# Patient Record
Sex: FEMALE | Race: WHITE | NOT HISPANIC OR LATINO | Employment: OTHER | URBAN - METROPOLITAN AREA
[De-identification: names, ages, dates, MRNs, and addresses within clinical notes are randomized per-mention and may not be internally consistent; named-entity substitution may affect disease eponyms.]

---

## 2018-02-12 ENCOUNTER — OFFICE VISIT (OUTPATIENT)
Dept: FAMILY MEDICINE CLINIC | Facility: MEDICAL CENTER | Age: 78
End: 2018-02-12

## 2018-02-12 DIAGNOSIS — Z53.21 PROC/TRTMT NOT CRD OUT D/T PT LV BEF SEEN BY HLTH CARE PROV: Primary | ICD-10-CM

## 2018-02-12 PROCEDURE — 99024 POSTOP FOLLOW-UP VISIT: CPT | Performed by: FAMILY MEDICINE

## 2018-02-13 ENCOUNTER — APPOINTMENT (OUTPATIENT)
Dept: RADIOLOGY | Facility: MEDICAL CENTER | Age: 78
End: 2018-02-13
Payer: MEDICARE

## 2018-02-13 ENCOUNTER — OFFICE VISIT (OUTPATIENT)
Dept: URGENT CARE | Facility: MEDICAL CENTER | Age: 78
End: 2018-02-13
Payer: MEDICARE

## 2018-02-13 VITALS
OXYGEN SATURATION: 96 % | WEIGHT: 211 LBS | TEMPERATURE: 98.3 F | HEIGHT: 65 IN | DIASTOLIC BLOOD PRESSURE: 80 MMHG | HEART RATE: 87 BPM | BODY MASS INDEX: 35.16 KG/M2 | RESPIRATION RATE: 18 BRPM | SYSTOLIC BLOOD PRESSURE: 160 MMHG

## 2018-02-13 DIAGNOSIS — S82.831A CLOSED FRACTURE OF DISTAL END OF RIGHT FIBULA, UNSPECIFIED FRACTURE MORPHOLOGY, INITIAL ENCOUNTER: Primary | ICD-10-CM

## 2018-02-13 PROCEDURE — 99213 OFFICE O/P EST LOW 20 MIN: CPT

## 2018-02-13 PROCEDURE — 73610 X-RAY EXAM OF ANKLE: CPT

## 2018-02-13 PROCEDURE — G0463 HOSPITAL OUTPT CLINIC VISIT: HCPCS

## 2018-02-13 RX ORDER — LOSARTAN POTASSIUM 100 MG/1
100 TABLET ORAL
COMMUNITY
Start: 2018-02-07

## 2018-02-13 RX ORDER — DIAPER,BRIEF,ADULT, DISPOSABLE
1200 EACH MISCELLANEOUS
COMMUNITY
Start: 2013-08-27

## 2018-02-13 RX ORDER — GLIMEPIRIDE 2 MG/1
TABLET ORAL
COMMUNITY
Start: 2015-06-29

## 2018-02-13 RX ORDER — INFLIXIMAB 100 MG/10ML
100 INJECTION, POWDER, LYOPHILIZED, FOR SOLUTION INTRAVENOUS
COMMUNITY
Start: 2013-08-14 | End: 2021-07-13 | Stop reason: ALTCHOICE

## 2018-02-13 RX ORDER — PILOCARPINE HYDROCHLORIDE 40 MG/ML
SOLUTION/ DROPS OPHTHALMIC
COMMUNITY
Start: 2017-06-09

## 2018-02-13 RX ORDER — FOLIC ACID 1 MG/1
1 TABLET ORAL
COMMUNITY
Start: 2013-08-14 | End: 2021-11-29 | Stop reason: SDUPTHER

## 2018-02-13 NOTE — PATIENT INSTRUCTIONS
Short leg splint applied  No weight-bearing  Follow up Orthopedics  Take Tylenol as directed for pain  Use ice as directed  Go to the ER for worsening symptoms  Ankle Fracture   WHAT YOU NEED TO KNOW:   An ankle fracture is a break in 1 or more of the bones in your ankle  DISCHARGE INSTRUCTIONS:   Call 911 for any of the following:   · You feel lightheaded, short of breath, and have chest pain  · You cough up blood  Return to the emergency department if:   · Your leg feels warm, tender, and painful  It may look swollen and red  · Blood soaks through your bandage  · You have severe pain in your ankle  · Your cast feels too tight  · Your foot or toes are cold or numb  · Your foot or toenails turn blue or gray  Contact your healthcare provider if:   · Your splint feels too tight  · Your swelling has increased or returned  · You have a fever  · Your pain does not go away, even after treatment  · You have questions or concerns about your condition or care  Medicines: You may need any of the following:  · Acetaminophen  decreases pain and fever  It is available without a doctor's order  Ask how much to take and how often to take it  Follow directions  Acetaminophen can cause liver damage if not taken correctly  · NSAIDs , such as ibuprofen, help decrease swelling, pain, and fever  This medicine is available with or without a doctor's order  NSAIDs can cause stomach bleeding or kidney problems in certain people  If you take blood thinner medicine, always ask your healthcare provider if NSAIDs are safe for you  Always read the medicine label and follow directions  · Prescription pain medicine  may be given  Ask your healthcare provider how to take this medicine safely  · Take your medicine as directed  Contact your healthcare provider if you think your medicine is not helping or if you have side effects  Tell him or her if you are allergic to any medicine   Keep a list of the medicines, vitamins, and herbs you take  Include the amounts, and when and why you take them  Bring the list or the pill bottles to follow-up visits  Carry your medicine list with you in case of an emergency  Follow up with your healthcare provider in 1 to 2 days: Your fracture may need to be reduced (bones pushed back into place) or you may need surgery  Write down your questions so you remember to ask them during your visits  Support devices: You will be given a brace, cast, or splint to limit your movement and protect your ankle  You may need to use crutches to protect your ankle and decrease your pain as you move around  Do not remove your device and do not put weight on your injured ankle  Splint and cast care:  Cover the splint or cast before you bathe so it does not get wet  Tape 2 plastic trash bags to your skin above the cast  Try to keep your ankle out of the water as much as possible  Rest:  Rest your ankle so that it can heal  Return to normal activities as directed  Ice:  Apply ice on your ankle for 15 to 20 minutes every hour or as directed  Use an ice pack, or put crushed ice in a plastic bag  Cover it with a towel  Ice helps prevent tissue damage and decreases swelling and pain  Elevate:  Elevate your ankle above the level of your heart as often as you can  This will help decrease swelling and pain  Prop your ankle on pillows or blankets to keep it elevated comfortably  © 2017 [employer] Information is for End User's use only and may not be sold, redistributed or otherwise used for commercial purposes  All illustrations and images included in CareNotes® are the copyrighted property of [employer]  or [employer]  The above information is an  only  It is not intended as medical advice for individual conditions or treatments   Talk to your doctor, nurse or pharmacist before following any medical regimen to see if it is safe and effective for you

## 2018-02-14 NOTE — PROGRESS NOTES
[email or web address] [facility]        NAME: David Xiong is a 65 y o  female  : 1940    MRN: [id number]  DATE: 2018  TIME: 10:15 PM    Assessment and Plan   Closed fracture of distal end of right fibula, unspecified fracture morphology, initial encounter [[id number] [id number]]  1  Closed fracture of distal end of right fibula, unspecified fracture morphology, initial encounter  X-ray ankle right 3+ views    Ambulatory referral to [facility] pcp program referral         Patient Instructions     Patient Instructions   Short leg splint applied  No weight-bearing  Follow up Orthopedics  Take Tylenol as directed for pain  Use ice as directed  Go to the ER for worsening symptoms  Ankle Fracture   WHAT YOU NEED TO KNOW:   An ankle fracture is a break in 1 or more of the bones in your ankle  DISCHARGE INSTRUCTIONS:   Call 911 for any of the following:   · You feel lightheaded, short of breath, and have chest pain  · You cough up blood  Return to the emergency department if:   · Your leg feels warm, tender, and painful  It may look swollen and red  · Blood soaks through your bandage  · You have severe pain in your ankle  · Your cast feels too tight  · Your foot or toes are cold or numb  · Your foot or toenails turn blue or gray  Contact your healthcare provider if:   · Your splint feels too tight  · Your swelling has increased or returned  · You have a fever  · Your pain does not go away, even after treatment  · You have questions or concerns about your condition or care  Medicines: You may need any of the following:  · Acetaminophen  decreases pain and fever  It is available without a doctor's order  Ask how much to take and how often to take it  Follow directions  Acetaminophen can cause liver damage if not taken correctly  · NSAIDs , such as ibuprofen, help decrease swelling, pain, and fever  This medicine is available with or without a doctor's order   NSAIDs can cause stomach bleeding or kidney problems in certain people  If you take blood thinner medicine, always ask your healthcare provider if NSAIDs are safe for you  Always read the medicine label and follow directions  · Prescription pain medicine  may be given  Ask your healthcare provider how to take this medicine safely  · Take your medicine as directed  Contact your healthcare provider if you think your medicine is not helping or if you have side effects  Tell him or her if you are allergic to any medicine  Keep a list of the medicines, vitamins, and herbs you take  Include the amounts, and when and why you take them  Bring the list or the pill bottles to follow-up visits  Carry your medicine list with you in case of an emergency  Follow up with your healthcare provider in 1 to 2 days: Your fracture may need to be reduced (bones pushed back into place) or you may need surgery  Write down your questions so you remember to ask them during your visits  Support devices: You will be given a brace, cast, or splint to limit your movement and protect your ankle  You may need to use crutches to protect your ankle and decrease your pain as you move around  Do not remove your device and do not put weight on your injured ankle  Splint and cast care:  Cover the splint or cast before you bathe so it does not get wet  Tape 2 plastic trash bags to your skin above the cast  Try to keep your ankle out of the water as much as possible  Rest:  Rest your ankle so that it can heal  Return to normal activities as directed  Ice:  Apply ice on your ankle for 15 to 20 minutes every hour or as directed  Use an ice pack, or put crushed ice in a plastic bag  Cover it with a towel  Ice helps prevent tissue damage and decreases swelling and pain  Elevate:  Elevate your ankle above the level of your heart as often as you can  This will help decrease swelling and pain   Prop your ankle on pillows or blankets to keep it elevated comfortably  © 2017 [facility] Information is for End User's use only and may not be sold, redistributed or otherwise used for commercial purposes  All illustrations and images included in CareNotes® are the copyrighted property of [employer]  or [employer]  The above information is an  only  It is not intended as medical advice for individual conditions or treatments  Talk to your doctor, nurse or pharmacist before following any medical regimen to see if it is safe and effective for you  Chief Complaint     Chief Complaint   Patient presents with    Ankle Pain     margarette 27         History of Present Illness   Amrit Tellez presents to the clinic c/o    Ankle Pain    Incident onset: x 2 weeks ago  The incident occurred at home  The injury mechanism was an inversion injury (Pt got up from chair and inverted ankle)  The pain is present in the right ankle  The pain is at a severity of 6/10  The pain is moderate  The pain has been constant since onset  Associated symptoms include an inability to bear weight and muscle weakness  Pertinent negatives include no loss of motion, loss of sensation, numbness or tingling  Nothing aggravates the symptoms  She has tried acetaminophen for the symptoms  The treatment provided mild relief  Review of Systems   Review of Systems   Constitutional: Negative for chills, fatigue and fever  HENT: Negative for congestion, ear pain, hearing loss, postnasal drip, sinus pain, sinus pressure and sore throat  Eyes: Negative for pain and discharge  Respiratory: Negative for chest tightness and shortness of breath  Cardiovascular: Negative for chest pain  Gastrointestinal: Negative for abdominal pain, constipation, nausea and vomiting  Genitourinary: Negative for difficulty urinating  Musculoskeletal: Positive for joint swelling  Negative for arthralgias and myalgias  Skin: Negative for rash     Neurological: Negative for dizziness, tingling, numbness and headaches  Psychiatric/Behavioral: Negative for behavioral problems  Current Medications     Long-Term Prescriptions   Medication Sig Dispense Refill    Ascorbic Acid, Vitamin C, (VITAMIN C) 100 MG tablet Take 500 mg by mouth      bimatoprost (LUMIGAN) 0 01 % ophthalmic drops       Cholecalciferol 2000 units CAPS Take 2,000 Units by mouth      folic acid (FOLVITE) 1 mg tablet Take 1 mg by mouth      inFLIXimab (REMICADE) 100 mg Infuse 100 mg into a venous catheter      Lecithin 1200 MG CAPS Take 1,200 mg by mouth      losartan (COZAAR) 100 MG tablet Take 100 mg by mouth      metFORMIN (GLUCOPHAGE) 500 mg tablet Take 500 mg by mouth      timolol (TIMOPTIC) 0 25 % ophthalmic solution          Current Allergies     Allergies as of 02/13/2018 - Reviewed 02/13/2018   Allergen Reaction Noted    Terbinafine  07/28/2015    Brimonidine Rash 12/06/2016            The following portions of the patient's history were reviewed and updated as appropriate: allergies, current medications, past family history, past medical history, past social history, past surgical history and problem list     Objective   /80   Pulse 87   Temp 98 3 °F (36 8 °C) (Temporal)   Resp 18   Ht 5' 5" (1 651 m)   Wt 95 7 kg (211 lb)   SpO2 96%   BMI 35 11 kg/m²        Physical Exam     Physical Exam   Constitutional: She appears well-developed  Cardiovascular: Normal rate, regular rhythm and normal heart sounds  Pulmonary/Chest: Effort normal and breath sounds normal    Musculoskeletal:        Right ankle: She exhibits decreased range of motion, swelling and ecchymosis  She exhibits no deformity, no laceration and normal pulse  Tenderness  Lateral malleolus tenderness found  Achilles tendon normal    Nursing note and vitals reviewed

## 2018-02-21 ENCOUNTER — OFFICE VISIT (OUTPATIENT)
Dept: OBGYN CLINIC | Facility: MEDICAL CENTER | Age: 78
End: 2018-02-21
Payer: MEDICARE

## 2018-02-21 ENCOUNTER — APPOINTMENT (OUTPATIENT)
Dept: RADIOLOGY | Facility: MEDICAL CENTER | Age: 78
End: 2018-02-21
Payer: MEDICARE

## 2018-02-21 VITALS
WEIGHT: 210 LBS | BODY MASS INDEX: 34.99 KG/M2 | HEIGHT: 65 IN | DIASTOLIC BLOOD PRESSURE: 114 MMHG | SYSTOLIC BLOOD PRESSURE: 195 MMHG | HEART RATE: 76 BPM

## 2018-02-21 DIAGNOSIS — S82.891A CLOSED FRACTURE OF RIGHT ANKLE, INITIAL ENCOUNTER: ICD-10-CM

## 2018-02-21 DIAGNOSIS — M25.571 PAIN, JOINT, ANKLE AND FOOT, RIGHT: ICD-10-CM

## 2018-02-21 DIAGNOSIS — S82.831A CLOSED FRACTURE OF DISTAL END OF RIGHT FIBULA, UNSPECIFIED FRACTURE MORPHOLOGY, INITIAL ENCOUNTER: Primary | ICD-10-CM

## 2018-02-21 PROCEDURE — 99204 OFFICE O/P NEW MOD 45 MIN: CPT | Performed by: FAMILY MEDICINE

## 2018-02-21 PROCEDURE — 73610 X-RAY EXAM OF ANKLE: CPT

## 2018-02-21 PROCEDURE — 73600 X-RAY EXAM OF ANKLE: CPT

## 2018-02-21 RX ORDER — TIMOLOL MALEATE 5 MG/ML
SOLUTION/ DROPS OPHTHALMIC
COMMUNITY
Start: 2018-01-15

## 2018-02-21 NOTE — PROGRESS NOTES
1  Closed fracture of distal end of right fibula, unspecified fracture morphology, initial encounter     2  Pain, joint, ankle and foot, right  XR ankle 3+ vw right    XR ankle 2 vw right   3  Closed fracture of right ankle, initial encounter  Misc  Devices (ROLLATOR) MISC     Orders Placed This Encounter   Procedures    XR ankle 3+ vw right    XR ankle 2 vw right        Imaging Studies (I personally reviewed results in PACS):  X-ray right ankle a 02/21/2018:  Oblique fracture distal fibula De León C  Manual stress test performed by physician shows asymmetric widening of the medial clear space to 4 mm    IMPRESSION:  Right ankle distal fibular fracture De León B  Borderline widened Medial clear space 4+ mm compared to lateral clear space 2 mm  Date of injury 01/27/2018  Initial visit with urgent care 02/13/2018  Initial visit with Sports Medicine 02/21/2018  Follow-up interval:  3 weeks    Return for Follow-up with surgeon  Patient Instructions   Patient to start nonweightbearing and cast today  Patient follow with surgeon to discuss need for surgery due to medial clear space gapping and possible deltoid rupture      reviewed cast precautions including no wet, walking on cast, and to use crutches  instructed if any swelling, pain, numbness, tingling then to contact office immediately for instruction or go to ER if physician unavailable  reviewed risks of short leg cast including ankle stiffness, skin breakdown, ulceration, especially at achilles and heel  patient expressed understanding and agreed to plan  CHIEF COMPLAINT:   Right ankle fracture    HPI:  Rosa Marcano is a 65 y o  female  who presents for evaluation of right ankle fracture  Patient initially presented to urgent care on 02/13/2018 for evaluation  She was seen by physician assistant there for an injury that occurred approximately 2 weeks prior to that  Follow-up interval today estimated approximately 3-4 weeks    She was placed in a splint for fracture  Patient states he was at a friend's L slipped off a sofa on rolled her ankle  Today, patient states she has persistent pain  She points to the lateral as well as the medial aspect of her ankle as source of pain  She has been noncompliant with nonweightbearing  She presents with posterior ankle splint with a flip-flop attached to it  Patient states she has been walking on it in a walker  Review of Systems   Constitutional: Negative for chills, fever and unexpected weight change  HENT: Negative for hearing loss, nosebleeds and sore throat  Eyes: Negative for pain, redness and visual disturbance  Respiratory: Negative for cough, shortness of breath and wheezing  Cardiovascular: Negative for chest pain, palpitations and leg swelling  Gastrointestinal: Negative for abdominal distention, nausea and vomiting  Endocrine: Negative for polydipsia and polyuria  Genitourinary: Negative for dysuria and hematuria  Skin: Negative for rash and wound  Neurological: Negative for dizziness, numbness and headaches  Psychiatric/Behavioral: Negative for decreased concentration and suicidal ideas  Following history reviewed and update:    Past Medical History:   Diagnosis Date    Diabetes mellitus ([facility] [address] )     Glaucoma     Hypertension      Past Surgical History:   Procedure Laterality Date    HEMORRHOID SURGERY       Social History   History   Alcohol Use    1 8 oz/week    3 Cans of beer per week     History   Drug use: Unknown     History   Smoking Status    Former Smoker   Smokeless Tobacco    Never Used     Family History   Problem Relation Age of Onset    Stroke Mother     Heart disease Father     Heart disease Brother      Allergies   Allergen Reactions    Terbinafine      Other reaction(s): Swelling, rash    Brimonidine Rash          Physical Exam   Constitutional: She is oriented to person, place, and time  She appears well-developed and well-nourished     HENT: Head: Normocephalic and atraumatic  Right Ear: External ear normal    Left Ear: External ear normal    Nose: Nose normal    Eyes: Conjunctivae are normal  Right eye exhibits no discharge  Left eye exhibits no discharge  No scleral icterus  Neck: Neck supple  Pulmonary/Chest: Effort normal  No respiratory distress  Neurological: She is alert and oriented to person, place, and time  Psychiatric: She has a normal mood and affect  Her behavior is normal  Judgment and thought content normal        Ortho Exam    Right Ankle and Foot Exam:  Erythema: no  Ecchymosis:   Positive medial deltoid ligamen  Edema:  2+ lateral ankle    Soft Tissue Tenderness:   AiTFL: no  (2cm proximal-medial to tip lateral malleolus 92% sens, 29% spec)  ATFL: no  CFL: no  PTFL: no  Achilles: no  Deltoid:  Positive yes    Bony Tenderpoints  Lateral Malleolus:   PositiveBase of 5th MT: no  Medial Malleolus: no  Navicular: no  Tibia: no  Fibula: no    ROM  Dorsiflexion:  0  Plantarflexion:  10      Procedures

## 2018-02-21 NOTE — PATIENT INSTRUCTIONS
Patient to start nonweightbearing and cast today  Patient follow with surgeon to discuss need for surgery due to medial clear space gapping and possible deltoid rupture

## 2018-02-22 ENCOUNTER — OFFICE VISIT (OUTPATIENT)
Dept: OBGYN CLINIC | Facility: MEDICAL CENTER | Age: 78
End: 2018-02-22
Payer: MEDICARE

## 2018-02-22 ENCOUNTER — APPOINTMENT (OUTPATIENT)
Dept: RADIOLOGY | Facility: MEDICAL CENTER | Age: 78
End: 2018-02-22
Payer: MEDICARE

## 2018-02-22 VITALS
WEIGHT: 205 LBS | BODY MASS INDEX: 34.16 KG/M2 | DIASTOLIC BLOOD PRESSURE: 114 MMHG | HEART RATE: 80 BPM | HEIGHT: 65 IN | SYSTOLIC BLOOD PRESSURE: 172 MMHG

## 2018-02-22 DIAGNOSIS — M25.571 PAIN, JOINT, ANKLE AND FOOT, RIGHT: Primary | ICD-10-CM

## 2018-02-22 DIAGNOSIS — S82.401A CLOSED FRACTURE OF SHAFT OF RIGHT FIBULA, UNSPECIFIED FRACTURE MORPHOLOGY, INITIAL ENCOUNTER: ICD-10-CM

## 2018-02-22 PROCEDURE — 27808 TREATMENT OF ANKLE FRACTURE: CPT | Performed by: ORTHOPAEDIC SURGERY

## 2018-02-22 PROCEDURE — 99213 OFFICE O/P EST LOW 20 MIN: CPT | Performed by: ORTHOPAEDIC SURGERY

## 2018-02-22 PROCEDURE — 73600 X-RAY EXAM OF ANKLE: CPT

## 2018-02-22 NOTE — PROGRESS NOTES
Assessment:  1  Pain, joint, ankle and foot, right  XR ankle 2 vw right   2  Closed fracture of shaft of right fibula, unspecified fracture morphology, initial encounter       There is no problem list on file for this patient  Plan      Continue with non operative care  She can follow up with Dr Tanvi Bueno or myself in 3 weeks remove the cast repeat x-rays            Subjective:     Patient ID:    Chief Guzman Rater 65 y o  female      HPI    Patient comes in today after being seen by Dr Roberto Epps last week  This was 2-3 weeks out from her original injury  The performed a stress view x-ray and found slight gapping on the medial side  1 millimeter difference compared to the superior space  No more than 4 5 millimeters in its entirety  Patient also has a history of diabetes and is on disease modifying antirheumatic drugs  Other review of systems are otherwise unremarkable  Very tangential with her thought process  Not able to focus very long and easily distracted    The following portions of the patient's history were reviewed and updated as appropriate: allergies, current medications, past family history, past social history, past surgical history and problem list         Social History     Social History    Marital status: /Civil Union     Spouse name: N/A    Number of children: N/A    Years of education: N/A     Occupational History    Not on file       Social History Main Topics    Smoking status: Former Smoker    Smokeless tobacco: Never Used    Alcohol use 1 8 oz/week     3 Cans of beer per week    Drug use: Unknown    Sexual activity: Not on file     Other Topics Concern    Not on file     Social History Narrative    No narrative on file     Past Medical History:   Diagnosis Date    Diabetes mellitus ([address] )     Glaucoma     Hypertension      Past Surgical History:   Procedure Laterality Date    HEMORRHOID SURGERY       Allergies   Allergen Reactions    Terbinafine      Other reaction(s): Swelling, rash    Brimonidine Rash     Current Outpatient Prescriptions on File Prior to Visit   Medication Sig Dispense Refill    Ascorbic Acid, Vitamin C, (VITAMIN C) 100 MG tablet Take 500 mg by mouth      bimatoprost (LUMIGAN) 0 01 % ophthalmic drops       Cholecalciferol 2000 units CAPS Take 2,000 Units by mouth      folic acid (FOLVITE) 1 mg tablet Take 1 mg by mouth      inFLIXimab (REMICADE) 100 mg Infuse 100 mg into a venous catheter      Lecithin 1200 MG CAPS Take 1,200 mg by mouth      losartan (COZAAR) 100 MG tablet Take 100 mg by mouth      metFORMIN (GLUCOPHAGE) 500 mg tablet Take 500 mg by mouth      methotrexate 2 5 mg tablet       Methotrexate 2 5 MG/ML SOLN Take 2 5 mg by mouth      Misc  Devices (ROLLATOR) MISC Pt will be NWB right ankle due to casted fx 1 each 0    pilocarpine (ISOPTO CARPINE) 4 % ophthalmic solution       timolol (TIMOPTIC) 0 25 % ophthalmic solution       timolol (TIMOPTIC) 0 5 % ophthalmic solution        No current facility-administered medications on file prior to visit  Objective:        Ortho Exam  Patient is able wiggle her toes capillary refill less than 3 seconds there is some trophic changes on the toes  I have personally reviewed pertinent films in PACS and my interpretation is X-ray show stable healing fibular fracture medial clear space widening is no more than 1 millimeter greater than the superior aspect of the clear space  It is not greater than 6 millimeters       Portions of the record may have been created with voice recognition software   Occasional wrong word or "sound a like" substitutions may have occurred due to the inherent limitations of voice recognition software   Read the chart carefully and recognize, using context, where substitutions have occurred

## 2018-03-01 ENCOUNTER — OFFICE VISIT (OUTPATIENT)
Dept: OBGYN CLINIC | Facility: MEDICAL CENTER | Age: 78
End: 2018-03-01

## 2018-03-01 VITALS — HEIGHT: 65 IN | BODY MASS INDEX: 34.16 KG/M2 | WEIGHT: 205 LBS

## 2018-03-01 DIAGNOSIS — S82.842A BIMALLEOLAR ANKLE FRACTURE, LEFT, CLOSED, INITIAL ENCOUNTER: Primary | ICD-10-CM

## 2018-03-01 PROCEDURE — 99024 POSTOP FOLLOW-UP VISIT: CPT | Performed by: ORTHOPAEDIC SURGERY

## 2018-03-01 NOTE — PROGRESS NOTES
Assessment:  1  Bimalleolar ankle fracture, left, closed, initial encounter       Patient Active Problem List   Diagnosis    Bimalleolar ankle fracture, left, closed, initial encounter           Plan      Follow-up at predetermined visit            Subjective:     Patient ID:    Chief Complaint:Lorin Graham [age] y o  female      HPI    Patient comes in thought her cast was loose she is here to be evaluated  The following portions of the patient's history were reviewed and updated as appropriate: allergies, current medications, past family history, past social history, past surgical history and problem list         Social History     Social History    Marital status: /Civil Union     Spouse name: N/A    Number of children: N/A    Years of education: N/A     Occupational History    Not on file       Social History Main Topics    Smoking status: Former Smoker    Smokeless tobacco: Never Used    Alcohol use 1 8 oz/week     3 Cans of beer per week    Drug use: Unknown    Sexual activity: Not on file     Other Topics Concern    Not on file     Social History Narrative    No narrative on file     Past Medical History:   Diagnosis Date    Diabetes mellitus ([facility] [address]     Glaucoma     Hypertension      Past Surgical History:   Procedure Laterality Date    HEMORRHOID SURGERY       Allergies   Allergen Reactions    Terbinafine      Other reaction(s): Swelling, rash    Brimonidine Rash     Current Outpatient Prescriptions on File Prior to Visit   Medication Sig Dispense Refill    Ascorbic Acid, Vitamin C, (VITAMIN C) 100 MG tablet Take 500 mg by mouth      bimatoprost (LUMIGAN) 0 01 % ophthalmic drops       Cholecalciferol 2000 units CAPS Take 2,000 Units by mouth      folic acid (FOLVITE) 1 mg tablet Take 1 mg by mouth      inFLIXimab (REMICADE) 100 mg Infuse 100 mg into a venous catheter      Lecithin 1200 MG CAPS Take 1,200 mg by mouth      losartan (COZAAR) 100 MG tablet Take 100 mg by mouth      metFORMIN (GLUCOPHAGE) 500 mg tablet Take 500 mg by mouth      methotrexate 2 5 mg tablet       Methotrexate 2 5 MG/ML SOLN Take 2 5 mg by mouth      Misc  Devices (ROLLATOR) MISC Pt will be NWB right ankle due to casted fx 1 each 0    pilocarpine (ISOPTO CARPINE) 4 % ophthalmic solution       timolol (TIMOPTIC) 0 25 % ophthalmic solution       timolol (TIMOPTIC) 0 5 % ophthalmic solution        No current facility-administered medications on file prior to visit  Objective:        Ortho Exam    There is minimal gapping on the proximal or distal aspect of the ankle there is no shifting of the cast on the ankle  Portions of the record may have been created with voice recognition software   Occasional wrong word or "sound a like" substitutions may have occurred due to the inherent limitations of voice recognition software   Read the chart carefully and recognize, using context, where substitutions have occurred

## 2018-03-15 ENCOUNTER — OFFICE VISIT (OUTPATIENT)
Dept: OBGYN CLINIC | Facility: MEDICAL CENTER | Age: 78
End: 2018-03-15

## 2018-03-15 ENCOUNTER — APPOINTMENT (OUTPATIENT)
Dept: RADIOLOGY | Facility: MEDICAL CENTER | Age: 78
End: 2018-03-15
Payer: MEDICARE

## 2018-03-15 VITALS
DIASTOLIC BLOOD PRESSURE: 123 MMHG | HEIGHT: 65 IN | WEIGHT: 205 LBS | SYSTOLIC BLOOD PRESSURE: 182 MMHG | BODY MASS INDEX: 34.16 KG/M2 | HEART RATE: 79 BPM

## 2018-03-15 DIAGNOSIS — S82.842A BIMALLEOLAR ANKLE FRACTURE, LEFT, CLOSED, INITIAL ENCOUNTER: Chronic | ICD-10-CM

## 2018-03-15 DIAGNOSIS — M25.571 PAIN, JOINT, ANKLE AND FOOT, RIGHT: Primary | ICD-10-CM

## 2018-03-15 DIAGNOSIS — M25.571 PAIN, JOINT, ANKLE AND FOOT, RIGHT: ICD-10-CM

## 2018-03-15 PROCEDURE — 99024 POSTOP FOLLOW-UP VISIT: CPT | Performed by: ORTHOPAEDIC SURGERY

## 2018-03-15 PROCEDURE — 73600 X-RAY EXAM OF ANKLE: CPT

## 2018-03-15 NOTE — PROGRESS NOTES
Assessment:  1  Pain, joint, ankle and foot, right  Ambulatory referral to Physical Therapy    CANCELED: XR ankle 2 vw left   2  Bimalleolar ankle fracture, left, closed, initial encounter       Patient Active Problem List   Diagnosis    Bimalleolar ankle fracture, left, closed, initial encounter           Plan      PT start with 50% weight bearing progress to weight-bearing as tolerated  Follow up with us in 4 weeks at which time I hope she is back in normal shoe wear repeat x-rays at that time            Subjective:     Patient ID:    Chief Rosa Jimenes 65 y o  female      HPI    Patient is now 6 weeks out from her initial injury  She has been treated with a cast   She reports no pain      The following portions of the patient's history were reviewed and updated as appropriate: allergies, current medications, past family history, past social history, past surgical history and problem list         Social History     Social History    Marital status: /Civil Union     Spouse name: N/A    Number of children: N/A    Years of education: N/A     Occupational History    Not on file       Social History Main Topics    Smoking status: Former Smoker    Smokeless tobacco: Never Used    Alcohol use 1 8 oz/week     3 Cans of beer per week    Drug use: Unknown    Sexual activity: Not on file     Other Topics Concern    Not on file     Social History Narrative    No narrative on file     Past Medical History:   Diagnosis Date    Diabetes mellitus ([facility] [address] )     Glaucoma     Hypertension      Past Surgical History:   Procedure Laterality Date    HEMORRHOID SURGERY       Allergies   Allergen Reactions    Terbinafine      Other reaction(s): Swelling, rash    Brimonidine Rash     Current Outpatient Prescriptions on File Prior to Visit   Medication Sig Dispense Refill    Ascorbic Acid, Vitamin C, (VITAMIN C) 100 MG tablet Take 500 mg by mouth      bimatoprost (LUMIGAN) 0 01 % ophthalmic drops  Cholecalciferol 2000 units CAPS Take 2,000 Units by mouth      folic acid (FOLVITE) 1 mg tablet Take 1 mg by mouth      inFLIXimab (REMICADE) 100 mg Infuse 100 mg into a venous catheter      Lecithin 1200 MG CAPS Take 1,200 mg by mouth      losartan (COZAAR) 100 MG tablet Take 100 mg by mouth      metFORMIN (GLUCOPHAGE) 500 mg tablet Take 500 mg by mouth      methotrexate 2 5 mg tablet       Methotrexate 2 5 MG/ML SOLN Take 2 5 mg by mouth      Misc  Devices (ROLLATOR) MISC Pt will be NWB right ankle due to casted fx 1 each 0    pilocarpine (ISOPTO CARPINE) 4 % ophthalmic solution       timolol (TIMOPTIC) 0 25 % ophthalmic solution       timolol (TIMOPTIC) 0 5 % ophthalmic solution        No current facility-administered medications on file prior to visit  Objective:        Ortho Exam    No tenderness or crepitus with range of motion no pain with range of motion  Sensations intact and she does not have pain over the fracture site    I have personally reviewed pertinent films in PACS and my interpretation is X-ray show stable ankle fracture  Portions of the record may have been created with voice recognition software   Occasional wrong word or "sound a like" substitutions may have occurred due to the inherent limitations of voice recognition software   Read the chart carefully and recognize, using context, where substitutions have occurred

## 2018-03-26 ENCOUNTER — EVALUATION (OUTPATIENT)
Dept: PHYSICAL THERAPY | Facility: MEDICAL CENTER | Age: 78
End: 2018-03-26
Payer: MEDICARE

## 2018-03-26 DIAGNOSIS — M25.571 PAIN, JOINT, ANKLE AND FOOT, RIGHT: ICD-10-CM

## 2018-03-26 DIAGNOSIS — S82.831D CLOSED FRACTURE OF DISTAL END OF FIBULA WITH TIBIA, RIGHT, WITH ROUTINE HEALING, SUBSEQUENT ENCOUNTER: Primary | ICD-10-CM

## 2018-03-26 DIAGNOSIS — S82.301D CLOSED FRACTURE OF DISTAL END OF FIBULA WITH TIBIA, RIGHT, WITH ROUTINE HEALING, SUBSEQUENT ENCOUNTER: Primary | ICD-10-CM

## 2018-03-26 PROCEDURE — G8981 BODY POS CURRENT STATUS: HCPCS | Performed by: PHYSICAL THERAPIST

## 2018-03-26 PROCEDURE — 97162 PT EVAL MOD COMPLEX 30 MIN: CPT | Performed by: PHYSICAL THERAPIST

## 2018-03-26 PROCEDURE — G8982 BODY POS GOAL STATUS: HCPCS | Performed by: PHYSICAL THERAPIST

## 2018-03-26 PROCEDURE — 97110 THERAPEUTIC EXERCISES: CPT | Performed by: PHYSICAL THERAPIST

## 2018-03-26 NOTE — PROGRESS NOTES
PT Evaluation     Today's date: 3/26/2018  Patient name: Clearnce Angelucci  : 1940  MRN: [id number]  Referring provider: Harleen Lea DO  Dx:   Encounter Diagnosis     ICD-10-CM    1  Pain, joint, ankle and foot, right M25 571 Ambulatory referral to Physical Therapy                  Assessment  Impairments: abnormal gait, abnormal or restricted ROM, activity intolerance, impaired balance, impaired physical strength and lacks appropriate home exercise program    Assessment details: Clearnce Angelucci is a 65 y o  female who presents with Closed fracture of distal end of fibula with tibia, right, with routine healing, subsequent encounter  (primary encounter diagnosis)  Pain, joint, ankle and foot, right  Patient presents alert and oriented with the above impairments  Mary Tompkins will benefit from PT to addres deficits in order to maximize and return to prior level of function  No further referral appears necessary at this time based upon examination results  Prognosis is good given HEP compliance  Please contact me if you have any questions or recommendations  Understanding of Dx/Px/POC: good   Prognosis: good    Goals  Short Term Goals:   1  Pain decreased 2 ratings in 4 weeks  2  ROM increased 10* in 4 weeks  3  Strength increased 1/2 grade in 4 weeks    Long Term Goals:   1  ADLS/IADLS in related activities improved to maximal level in 8 weeks  2  Ambulation is improved to maximal level in 8 weeks  3  [place] with HEP in 8 weeks      Plan  Patient would benefit from: skilled PT  Planned modality interventions: cryotherapy  Planned therapy interventions: balance, flexibility, functional ROM exercises, gait training, therapeutic exercise, stretching, strengthening, patient education, neuromuscular re-education, manual therapy and home exercise program  Frequency: 2x week  Duration in weeks: 8  Treatment plan discussed with: patient  Plan details: Advised to bring sneaker and cane to visits in order to progress as able  Subjective Evaluation    History of Present Illness  Mechanism of injury: Pt reports on  she slipped when getting off of couch and rolled her ankle  On   she went to urgent care and underwent xrays which revealed ankle fracture  She was placed in cast and instructed to f/u w/ orthopaedic  She was advised to remain in cast and most recently transitioned to CAM Boot  At last f/u w/ MD on 3/15 she was referred to PT w/ instruction to progress to [facility] and wean from boot when able  She presents today w/ reports of pain only from boot  She denies ankle pain  She repeatedly states she has ongoing pain from boot and feels that is limiting her function more than anything in combination with fear and apprehension  She continues to ambulate w/ walker at all times  She is doing "nothing" around the house and walks very minimal around the house  She denies sleep disturbance  She is able to bathe independently outside the bathtub  Has not yet stepped into tub  She does have grab bars and shower chair, but remains apprehensive to use  She is able to dress independently  She has not returned to driving  Pain  At best pain ratin  At worst pain ratin    Patient Goals  Patient goals for therapy: decreased pain, increased motion, increased strength and independence with ADLs/IADLs          Objective     Active Range of Motion   Left Ankle/Foot   Dorsiflexion (ke): 5 degrees   Plantar flexion: 70 degrees   Inversion: 35 degrees   Eversion: 18 degrees     Right Ankle/Foot   Dorsiflexion (ke): -3 degrees   Plantar flexion: 50 degrees   Inversion: 18 degrees   Eversion: 7 degrees     Ambulation     Comments   Ambulates w/ wheeled walker and CAM boot demonstrating B LE Er  Attempted ambulation w/ SPC today; patient was very fearful and utilized PT for additional UE support  Required cues for AD placement and sequencing      Advised to continue ambulation w/ walker at this time          Flowsheet Rows    Flowsheet Row Most Recent Value   PT/OT G-Codes   Current Score  30   Projected Score  53   FOTO information reviewed  Yes   Assessment Type  Evaluation   G code set  Changing & Maintaining Body Position   Changing and Maintaining Body Position Current Status ()  CL   Changing and Maintaining Body Position Goal Status ()  CK          Precautions: Dm, hypertension    Daily Treatment Diary     Manual  3/26            PROM R ankle nv                                                                    Exercise Diary  3/26            Recumbent bike nv            Ankle AROM 20            Ankle circles nv            Ankle ABCs nv            gastroc towel stretch nv            Seated HR/TR nv            Seated rockerboard ROM nv            Weight shifting  nv                                                                                                                                                                            Modalities  3/26            CP post nv

## 2018-03-28 ENCOUNTER — OFFICE VISIT (OUTPATIENT)
Dept: PHYSICAL THERAPY | Facility: MEDICAL CENTER | Age: 78
End: 2018-03-28
Payer: MEDICARE

## 2018-03-28 DIAGNOSIS — M25.571 PAIN, JOINT, ANKLE AND FOOT, RIGHT: Primary | ICD-10-CM

## 2018-03-28 DIAGNOSIS — S82.831D CLOSED FRACTURE OF DISTAL END OF FIBULA WITH TIBIA, RIGHT, WITH ROUTINE HEALING, SUBSEQUENT ENCOUNTER: ICD-10-CM

## 2018-03-28 DIAGNOSIS — S82.301D CLOSED FRACTURE OF DISTAL END OF FIBULA WITH TIBIA, RIGHT, WITH ROUTINE HEALING, SUBSEQUENT ENCOUNTER: ICD-10-CM

## 2018-03-28 PROCEDURE — 97110 THERAPEUTIC EXERCISES: CPT

## 2018-03-28 PROCEDURE — 97112 NEUROMUSCULAR REEDUCATION: CPT

## 2018-03-28 PROCEDURE — 97140 MANUAL THERAPY 1/> REGIONS: CPT

## 2018-03-28 NOTE — PROGRESS NOTES
Daily Note     Today's date: 3/28/2018  Patient name: Arianna Tejeda  : 1940  MRN: [id number]  Referring provider: Venkata Phillip DO  Dx:   Encounter Diagnosis     ICD-10-CM    1  Pain, joint, ankle and foot, right M25 571    2  Closed fracture of distal end of fibula with tibia, right, with routine healing, subsequent encounter S82 301D     S82 581D                   Subjective: Pt reports that she is feeling tired today and that her whole body hurts  Notes that she is having minimal pain in the outside of her right ankle  Objective: See treatment diary below  Precautions: Dm, hypertension     Daily Treatment Diary      Manual  3/26  3/28                   PROM R ankle nv  10'                                                                                                                         Exercise Diary  3/26  3/28                   Recumbent bike nv  5'                   Ankle AROM 20  20x ea                   Ankle circles nv  20x ea                    Ankle ABCs nv  1x                   gastroc towel stretch nv  20"x4                   Seated HR/TR nv  30x ea                    Seated rockerboard ROM nv  m/l a/p 2x10                   Weight shifting  nv  10x each                                                                                                                                                                                                                                                                                                                         Modalities  3/26                     CP post nv                                                                            Assessment: Tolerated treatment fair  Patient demonstrated fatigue post treatment, exhibited good technique with therapeutic exercises and would benefit from continued PT  Pt presented to clinic today with a walker and a cane using the walker  She began on the bike with an increase in nervousness   She needed reassurance throughout treatment to calm her down as well as excessive verbal cuing for proper functional movement  Pt was fatigued at end if session  Plan: Continue per plan of care

## 2018-04-03 ENCOUNTER — OFFICE VISIT (OUTPATIENT)
Dept: PHYSICAL THERAPY | Facility: MEDICAL CENTER | Age: 78
End: 2018-04-03
Payer: MEDICARE

## 2018-04-03 DIAGNOSIS — M25.571 PAIN, JOINT, ANKLE AND FOOT, RIGHT: Primary | ICD-10-CM

## 2018-04-03 DIAGNOSIS — S82.301D CLOSED FRACTURE OF DISTAL END OF FIBULA WITH TIBIA, RIGHT, WITH ROUTINE HEALING, SUBSEQUENT ENCOUNTER: ICD-10-CM

## 2018-04-03 DIAGNOSIS — S82.831D CLOSED FRACTURE OF DISTAL END OF FIBULA WITH TIBIA, RIGHT, WITH ROUTINE HEALING, SUBSEQUENT ENCOUNTER: ICD-10-CM

## 2018-04-03 PROCEDURE — 97140 MANUAL THERAPY 1/> REGIONS: CPT

## 2018-04-03 PROCEDURE — 97110 THERAPEUTIC EXERCISES: CPT

## 2018-04-03 NOTE — PROGRESS NOTES
Daily Note     Today's date: 4/3/2018  Patient name: Delfino Thomas  : 1940  MRN: [id number]  Referring provider: Fatoumata Rodriguez DO  Dx:   Encounter Diagnosis     ICD-10-CM    1  Pain, joint, ankle and foot, right M25 571    2  Closed fracture of distal end of fibula with tibia, right, with routine healing, subsequent encounter S82 301D     S82 831D                   Subjective: Pt reports that she is not feeling well today and thinking she has some sort of infection  She has called the doctors and noted she was going to cancel today  Notes that the ankle is feeling good today  Objective: See treatment diary below  Precautions: Dm, hypertension     Daily Treatment Diary      Manual  3/26  3/28  4/3                 PROM R ankle nv  10'  10'                                                                                                                       Exercise Diary  3/26  3/28  4/3                 Recumbent bike nv  5'  5'                 Ankle AROM 20  20x ea  20x ea                 Ankle circles nv  20x ea   20x ea                 Ankle ABCs nv  1x  1x                 gastroc towel stretch nv  20"x4  20"x4                 Seated HR/TR nv  30x ea   30x ea                  Seated rockerboard ROM nv  m/l a/p 2x10  m/l a/p 20x                 Weight shifting  nv  10x each  nt                                                                                                                                                                                                                                                                                                                       Modalities  3/26                     CP post nv                                                                            Assessment: Tolerated treatment fair  Patient demonstrated fatigue post treatment, exhibited good technique with therapeutic exercises and would benefit from continued PT   Pt presented to clinic today with a walker but no cane  Took a more conservative approach today due to pt not feeling well which she was able to tolerate just needing extra time to perform all TE and for transfers  Plan: Continue per plan of care

## 2018-04-05 ENCOUNTER — OFFICE VISIT (OUTPATIENT)
Dept: PHYSICAL THERAPY | Facility: MEDICAL CENTER | Age: 78
End: 2018-04-05
Payer: MEDICARE

## 2018-04-05 DIAGNOSIS — S82.831D CLOSED FRACTURE OF DISTAL END OF FIBULA WITH TIBIA, RIGHT, WITH ROUTINE HEALING, SUBSEQUENT ENCOUNTER: ICD-10-CM

## 2018-04-05 DIAGNOSIS — S82.301D CLOSED FRACTURE OF DISTAL END OF FIBULA WITH TIBIA, RIGHT, WITH ROUTINE HEALING, SUBSEQUENT ENCOUNTER: ICD-10-CM

## 2018-04-05 DIAGNOSIS — M25.571 PAIN, JOINT, ANKLE AND FOOT, RIGHT: Primary | ICD-10-CM

## 2018-04-05 PROCEDURE — G8979 MOBILITY GOAL STATUS: HCPCS

## 2018-04-05 PROCEDURE — G8978 MOBILITY CURRENT STATUS: HCPCS

## 2018-04-05 PROCEDURE — 97110 THERAPEUTIC EXERCISES: CPT

## 2018-04-05 PROCEDURE — 97140 MANUAL THERAPY 1/> REGIONS: CPT

## 2018-04-05 NOTE — PROGRESS NOTES
Daily Note     Today's date: 2018  Patient name: Stewart Rausch  : 1940  MRN: [id number]  Referring provider: Tino Lombardo DO  Dx:   Encounter Diagnosis     ICD-10-CM    1  Pain, joint, ankle and foot, right M25 571    2  Closed fracture of distal end of fibula with tibia, right, with routine healing, subsequent encounter S82 301D     S82 205V                   Subjective: Pt reports that she is still not feeling well today and is going for a blood test tomorrow  Notes that the ankle is feeling good today and despite not feeling good, wanted to come in        Objective: See treatment diary below  Precautions: Dm, hypertension     Daily Treatment Diary      Manual  3/26  3/28  4/3  4/5               PROM R ankle nv  10'  10'  10'                                                                                                                     Exercise Diary  3/26  3/28  4/3  4/5               Recumbent bike nv  5'  5'  5'               Ankle AROM 20  20x ea  20x ea  20x ea               Ankle circles nv  20x ea   20x ea  20x                Ankle ABCs nv  1x  1x  2x               gastroc towel stretch nv  20"x4  20"x4  20"x4               Seated HR/TR nv  30x ea   30x ea   30x ea               Seated rockerboard ROM nv  m/l a/p 2x10  m/l a/p 20x  m/l a/p 20x               Weight shifting  nv  10x each  nt  nt                                                                                                                                                                                                                                                                                                                     Modalities  3/26                     CP post nv                                                                            Assessment: Tolerated treatment fair  Patient demonstrated fatigue post treatment, exhibited good technique with therapeutic exercises and would benefit from continued PT  Pt presented to clinic today with a walker but no cane again today  Took a more conservative approach today due to pt not feeling well which she was able to tolerate just needing extra time to perform all TE and for transfers  Plan: Continue per plan of care

## 2018-04-11 ENCOUNTER — APPOINTMENT (OUTPATIENT)
Dept: PHYSICAL THERAPY | Facility: MEDICAL CENTER | Age: 78
End: 2018-04-11
Payer: MEDICARE

## 2018-04-12 ENCOUNTER — APPOINTMENT (OUTPATIENT)
Dept: RADIOLOGY | Facility: MEDICAL CENTER | Age: 78
End: 2018-04-12
Payer: MEDICARE

## 2018-04-12 ENCOUNTER — OFFICE VISIT (OUTPATIENT)
Dept: OBGYN CLINIC | Facility: MEDICAL CENTER | Age: 78
End: 2018-04-12

## 2018-04-12 VITALS — HEIGHT: 65 IN | WEIGHT: 207 LBS | BODY MASS INDEX: 34.49 KG/M2

## 2018-04-12 DIAGNOSIS — S82.842A BIMALLEOLAR ANKLE FRACTURE, LEFT, CLOSED, INITIAL ENCOUNTER: Chronic | ICD-10-CM

## 2018-04-12 DIAGNOSIS — M25.571 PAIN, JOINT, ANKLE AND FOOT, RIGHT: ICD-10-CM

## 2018-04-12 DIAGNOSIS — M25.571 PAIN, JOINT, ANKLE AND FOOT, RIGHT: Primary | ICD-10-CM

## 2018-04-12 PROCEDURE — 99024 POSTOP FOLLOW-UP VISIT: CPT | Performed by: ORTHOPAEDIC SURGERY

## 2018-04-12 PROCEDURE — 73610 X-RAY EXAM OF ANKLE: CPT

## 2018-04-12 NOTE — PROGRESS NOTES
Assessment:  1  Pain, joint, ankle and foot, right  XR ankle 3+ vw right     Patient Active Problem List   Diagnosis    Bimalleolar ankle fracture, left, closed, initial encounter            Plan      Activity as tolerated weight-bearing as tolerated follow up as a as needed basis no further follow-up necessary  Subjective:     Patient ID:    Chief Andrew Menjivar 65 y o  female      HPI      Patient comes in with regards to her right ankle  She had a bimalleolar fracture  She is came in to see us 2 weeks later  She was evaluated by my partner x-rays were taken found to have bimalleolar fracture ankle fracture she comes in to see me of another week or so later  She was doing rather well she opted for non operative care she we casted her she is now doing excellent  She has no cast or boot on she is walking in normal shoes  She uses a walker more for her  Illness dizziness not for her ankle  Patient is satisfied with the progress regarding her ankle      The following portions of the patient's history were reviewed and updated as appropriate: allergies, current medications, past family history, past social history, past surgical history and problem list         Social History     Social History    Marital status: /Civil Union     Spouse name: N/A    Number of children: N/A    Years of education: N/A     Occupational History    Not on file       Social History Main Topics    Smoking status: Former Smoker    Smokeless tobacco: Never Used    Alcohol use 1 8 oz/week     3 Cans of beer per week    Drug use: Unknown    Sexual activity: Not on file     Other Topics Concern    Not on file     Social History Narrative    No narrative on file     Past Medical History:   Diagnosis Date    Diabetes mellitus ([address] )     Glaucoma     Hypertension      Past Surgical History:   Procedure Laterality Date    HEMORRHOID SURGERY       Allergies   Allergen Reactions    Terbinafine Other reaction(s): Swelling, rash    Brimonidine Rash     Current Outpatient Prescriptions on File Prior to Visit   Medication Sig Dispense Refill    Ascorbic Acid, Vitamin C, (VITAMIN C) 100 MG tablet Take 500 mg by mouth      bimatoprost (LUMIGAN) 0 01 % ophthalmic drops       Cholecalciferol 2000 units CAPS Take 2,000 Units by mouth      folic acid (FOLVITE) 1 mg tablet Take 1 mg by mouth      inFLIXimab (REMICADE) 100 mg Infuse 100 mg into a venous catheter      Lecithin 1200 MG CAPS Take 1,200 mg by mouth      losartan (COZAAR) 100 MG tablet Take 100 mg by mouth      metFORMIN (GLUCOPHAGE) 500 mg tablet Take 500 mg by mouth      methotrexate 2 5 mg tablet       Methotrexate 2 5 MG/ML SOLN Take 2 5 mg by mouth      Misc  Devices (ROLLATOR) MISC Pt will be NWB right ankle due to casted fx 1 each 0    pilocarpine (ISOPTO CARPINE) 4 % ophthalmic solution       timolol (TIMOPTIC) 0 25 % ophthalmic solution       timolol (TIMOPTIC) 0 5 % ophthalmic solution        No current facility-administered medications on file prior to visit  Objective:        Ortho Exam   range of motion is within normal limits to dorsiflexion is about 10 degree she could use a couple more degrees but otherwise within normal limits  I have personally reviewed pertinent films in PACS  Portions of the record may have been created with voice recognition software   Occasional wrong word or "sound a like" substitutions may have occurred due to the inherent limitations of voice recognition software   Read the chart carefully and recognize, using context, where substitutions have occurred

## 2018-04-13 ENCOUNTER — APPOINTMENT (OUTPATIENT)
Dept: PHYSICAL THERAPY | Facility: MEDICAL CENTER | Age: 78
End: 2018-04-13
Payer: MEDICARE

## 2018-04-16 ENCOUNTER — OFFICE VISIT (OUTPATIENT)
Dept: PHYSICAL THERAPY | Facility: MEDICAL CENTER | Age: 78
End: 2018-04-16
Payer: MEDICARE

## 2018-04-16 DIAGNOSIS — M25.571 PAIN, JOINT, ANKLE AND FOOT, RIGHT: Primary | ICD-10-CM

## 2018-04-16 DIAGNOSIS — S82.831D CLOSED FRACTURE OF DISTAL END OF FIBULA WITH TIBIA, RIGHT, WITH ROUTINE HEALING, SUBSEQUENT ENCOUNTER: ICD-10-CM

## 2018-04-16 DIAGNOSIS — S82.301D CLOSED FRACTURE OF DISTAL END OF FIBULA WITH TIBIA, RIGHT, WITH ROUTINE HEALING, SUBSEQUENT ENCOUNTER: ICD-10-CM

## 2018-04-16 NOTE — PROGRESS NOTES
Daily Note     Today's date: 2018  Patient name: Vitor Shearer  : 1940  MRN: [id number]  Referring provider: Bernarda Gipson DO  Dx:   Encounter Diagnosis     ICD-10-CM    1  Pain, joint, ankle and foot, right M25 571    2  Closed fracture of distal end of fibula with tibia, right, with routine healing, subsequent encounter S82 301D     S82 561D                   Subjective: Pt reports that her ankle is doing well today but everything else could be better  Objective: See treatment diary below  Precautions: Dm, hypertension     Daily Treatment Diary      Manual  3/26  3/28  4/3  4/5               PROM R ankle nv  10'  10'  10'                                                                                                                     Exercise Diary  3/26  3/28  4/3  4/5               Recumbent bike nv  5'  5'  5'               Ankle AROM 20  20x ea  20x ea  20x ea               Ankle circles nv  20x ea   20x ea  20x                Ankle ABCs nv  1x  1x  2x               gastroc towel stretch nv  20"x4  20"x4  20"x4               Seated HR/TR nv  30x ea   30x ea   30x ea               Seated rockerboard ROM nv  m/l a/p 2x10  m/l a/p 20x  m/l a/p 20x               Weight shifting  nv  10x each  nt  nt                                                                                                                                                                                                                                                                                                                     Modalities  3/26                     CP post nv                                                                            Assessment: Discussed with pt about holding therapy for now due to having no further concerns with her ankle  Her range is within normal limits and is having no pain  Pt was given an updated HEP with competency noted   She was instructed to figure out what is going on with other medical issues and to call PT if she has any further questions  Plan: Hold PT at this time, possible D/C

## 2018-04-18 ENCOUNTER — APPOINTMENT (OUTPATIENT)
Dept: PHYSICAL THERAPY | Facility: MEDICAL CENTER | Age: 78
End: 2018-04-18
Payer: MEDICARE

## 2018-06-02 ENCOUNTER — OFFICE VISIT (OUTPATIENT)
Dept: URGENT CARE | Facility: MEDICAL CENTER | Age: 78
End: 2018-06-02
Payer: MEDICARE

## 2018-06-02 VITALS
WEIGHT: 202 LBS | RESPIRATION RATE: 20 BRPM | TEMPERATURE: 98.7 F | DIASTOLIC BLOOD PRESSURE: 100 MMHG | OXYGEN SATURATION: 96 % | SYSTOLIC BLOOD PRESSURE: 156 MMHG | HEART RATE: 96 BPM | BODY MASS INDEX: 33.61 KG/M2

## 2018-06-02 DIAGNOSIS — I10 ESSENTIAL HYPERTENSION, BENIGN: ICD-10-CM

## 2018-06-02 DIAGNOSIS — R07.81 RIB PAIN: Primary | ICD-10-CM

## 2018-06-02 PROCEDURE — 99213 OFFICE O/P EST LOW 20 MIN: CPT | Performed by: PHYSICIAN ASSISTANT

## 2018-06-02 PROCEDURE — G0463 HOSPITAL OUTPT CLINIC VISIT: HCPCS | Performed by: PHYSICIAN ASSISTANT

## 2018-06-02 NOTE — PATIENT INSTRUCTIONS
1  Tylenol as needed for pain  2  Warm compresses to upper back as needed for discomfort  3  Recommend follow-up with PCP fir further evaluation of rib pain if symptoms persist and treatment of uncontrolled hypertension

## 2018-06-02 NOTE — PROGRESS NOTES
[facility]        NAME: Patricia Ramirez is a 77 y o  female  : 1940    MRN: [id number]  DATE: 2018  TIME: 10:48 AM    Assessment and Plan   Rib pain [R07 81]  1  Rib pain           Patient Instructions       Follow up with PCP in 3-5 days  Proceed to  ER if symptoms worsen  Chief Complaint     Chief Complaint   Patient presents with    Back Pain     upper back pain began after 5 weeks of coughing, worse at night when trying to rest          History of Present Illness       The patient is a 76-year-old female presents with the bilateral rib pain that increased over the past 3 days  She states she was treated by her family physician for a 5 week history of acute bronchitis  Patient states she was coughing frequently with the increasing sputum production  She had no fever or chest pain but did have some difficulty breathing  Patient states her symptoms have improved but the rib pain has persisted  Review of Systems   Review of Systems   Constitutional: Negative for fatigue and fever  HENT: Positive for congestion  Negative for rhinorrhea  Respiratory: Positive for cough  Negative for chest tightness and shortness of breath  Cardiovascular: Negative            Current Medications       Current Outpatient Prescriptions:     Ascorbic Acid, Vitamin C, (VITAMIN C) 100 MG tablet, Take 500 mg by mouth, Disp: , Rfl:     bimatoprost (LUMIGAN) 0 01 % ophthalmic drops, , Disp: , Rfl:     Cholecalciferol 2000 units CAPS, Take 2,000 Units by mouth, Disp: , Rfl:     folic acid (FOLVITE) 1 mg tablet, Take 1 mg by mouth, Disp: , Rfl:     inFLIXimab (REMICADE) 100 mg, Infuse 100 mg into a venous catheter, Disp: , Rfl:     Lecithin 1200 MG CAPS, Take 1,200 mg by mouth, Disp: , Rfl:     losartan (COZAAR) 100 MG tablet, Take 100 mg by mouth, Disp: , Rfl:     metFORMIN (GLUCOPHAGE) 500 mg tablet, Take 500 mg by mouth, Disp: , Rfl:     methotrexate 2 5 mg tablet, , Disp: , Rfl:   Methotrexate 2 5 MG/ML SOLN, Take 2 5 mg by mouth, Disp: , Rfl:     Misc  Devices (ROLLATOR) MISC, Pt will be NWB right ankle due to casted fx, Disp: 1 each, Rfl: 0    pilocarpine (ISOPTO CARPINE) 4 % ophthalmic solution, , Disp: , Rfl:     timolol (TIMOPTIC) 0 25 % ophthalmic solution, , Disp: , Rfl:     timolol (TIMOPTIC) 0 5 % ophthalmic solution, , Disp: , Rfl:     Current Allergies     Allergies as of 06/02/2018 - Reviewed 06/02/2018   Allergen Reaction Noted    Terbinafine  07/28/2015    Brimonidine Rash 12/06/2016            The following portions of the patient's history were reviewed and updated as appropriate: allergies, current medications, past family history, past medical history, past social history, past surgical history and problem list      Past Medical History:   Diagnosis Date    Arthritis     Diabetes mellitus ([facility] [address] )     Glaucoma     Hypertension        Past Surgical History:   Procedure Laterality Date    CATARACT EXTRACTION Bilateral     HEMORRHOID SURGERY         Family History   Problem Relation Age of Onset    Stroke Mother     Heart disease Father     Heart disease Brother          Medications have been verified  Objective   /100   Pulse 96   Temp 98 7 °F (37 1 °C) (Temporal)   Resp 20   Wt 91 6 kg (202 lb)   SpO2 96%   BMI 33 61 kg/m²        Physical Exam     Physical Exam   Constitutional: She appears well-developed and well-nourished  No distress  HENT:   Head: Normocephalic and atraumatic  Right Ear: Tympanic membrane normal    Left Ear: Tympanic membrane normal    Nose: Nose normal    Mouth/Throat: Uvula is midline, oropharynx is clear and moist and mucous membranes are normal    Cardiovascular: Normal rate, regular rhythm and normal heart sounds  No murmur heard  Pulmonary/Chest: Effort normal and breath sounds normal  No accessory muscle usage  No tachypnea  No respiratory distress

## 2018-07-19 ENCOUNTER — TRANSCRIBE ORDERS (OUTPATIENT)
Dept: ADMINISTRATIVE | Facility: HOSPITAL | Age: 78
End: 2018-07-19

## 2018-07-19 ENCOUNTER — APPOINTMENT (OUTPATIENT)
Dept: RADIOLOGY | Facility: MEDICAL CENTER | Age: 78
End: 2018-07-19
Payer: MEDICARE

## 2018-07-19 DIAGNOSIS — M15.0 PRIMARY GENERALIZED HYPERTROPHIC OSTEOARTHROSIS: ICD-10-CM

## 2018-07-19 DIAGNOSIS — M15.0 PRIMARY GENERALIZED HYPERTROPHIC OSTEOARTHROSIS: Primary | ICD-10-CM

## 2018-07-19 PROCEDURE — 73610 X-RAY EXAM OF ANKLE: CPT

## 2018-07-19 PROCEDURE — 73630 X-RAY EXAM OF FOOT: CPT

## 2020-11-10 ENCOUNTER — APPOINTMENT (OUTPATIENT)
Dept: RADIOLOGY | Facility: MEDICAL CENTER | Age: 80
End: 2020-11-10
Payer: MEDICARE

## 2020-11-10 ENCOUNTER — TRANSCRIBE ORDERS (OUTPATIENT)
Dept: ADMINISTRATIVE | Facility: HOSPITAL | Age: 80
End: 2020-11-10

## 2020-11-10 DIAGNOSIS — M17.0 OSTEOARTHRITIS OF BOTH KNEES, UNSPECIFIED OSTEOARTHRITIS TYPE: ICD-10-CM

## 2020-11-10 DIAGNOSIS — M48.00 SPINAL STENOSIS, UNSPECIFIED SPINAL REGION: ICD-10-CM

## 2020-11-10 DIAGNOSIS — M47.812 CERVICAL SPONDYLOSIS: ICD-10-CM

## 2020-11-10 DIAGNOSIS — M48.00 SPINAL STENOSIS, UNSPECIFIED SPINAL REGION: Primary | ICD-10-CM

## 2020-11-10 PROCEDURE — 72050 X-RAY EXAM NECK SPINE 4/5VWS: CPT

## 2020-11-10 PROCEDURE — 73560 X-RAY EXAM OF KNEE 1 OR 2: CPT

## 2020-11-10 PROCEDURE — 72110 X-RAY EXAM L-2 SPINE 4/>VWS: CPT

## 2021-07-13 PROBLEM — M47.816 SPONDYLOSIS OF LUMBAR REGION WITHOUT MYELOPATHY OR RADICULOPATHY: Status: ACTIVE | Noted: 2021-07-13

## 2021-07-13 PROBLEM — M15.0 PRIMARY GENERALIZED (OSTEO)ARTHRITIS: Status: ACTIVE | Noted: 2021-07-13

## 2021-07-13 PROBLEM — L40.59 POLYARTICULAR PSORIATIC ARTHRITIS (HCC): Status: ACTIVE | Noted: 2021-07-13

## 2021-11-03 PROBLEM — L40.0 PLAQUE PSORIASIS: Status: ACTIVE | Noted: 2021-11-03

## 2021-11-28 PROBLEM — E11.65 TYPE 2 DIABETES MELLITUS WITH HYPERGLYCEMIA, WITHOUT LONG-TERM CURRENT USE OF INSULIN (HCC): Status: ACTIVE | Noted: 2021-11-28

## 2021-11-28 PROBLEM — I63.9 CEREBROVASCULAR ACCIDENT (CVA) (HCC): Status: ACTIVE | Noted: 2021-11-28

## 2021-11-28 PROBLEM — R82.81 PYURIA: Status: ACTIVE | Noted: 2021-11-28

## 2021-11-28 PROBLEM — M17.0 PRIMARY OSTEOARTHRITIS OF BOTH KNEES: Status: ACTIVE | Noted: 2021-11-28

## 2021-11-28 PROBLEM — M48.061 DEGENERATIVE LUMBAR SPINAL STENOSIS: Status: ACTIVE | Noted: 2021-11-28

## 2023-02-08 PROBLEM — Z79.899 ENCOUNTER FOR LONG-TERM (CURRENT) USE OF OTHER MEDICATIONS: Status: ACTIVE | Noted: 2023-02-08

## 2023-04-26 ENCOUNTER — APPOINTMENT (EMERGENCY)
Dept: CT IMAGING | Facility: HOSPITAL | Age: 83
End: 2023-04-26

## 2023-04-26 ENCOUNTER — HOSPITAL ENCOUNTER (INPATIENT)
Facility: HOSPITAL | Age: 83
LOS: 2 days | Discharge: HOME HEALTH | End: 2023-04-29
Attending: SURGERY | Admitting: INTERNAL MEDICINE

## 2023-04-26 ENCOUNTER — APPOINTMENT (EMERGENCY)
Dept: RADIOLOGY | Facility: HOSPITAL | Age: 83
End: 2023-04-26

## 2023-04-26 DIAGNOSIS — R65.20 SEVERE SEPSIS (HCC): ICD-10-CM

## 2023-04-26 DIAGNOSIS — R53.1 GENERALIZED WEAKNESS: ICD-10-CM
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
## 2023-04-26 DIAGNOSIS — N17.9 AKI (ACUTE KIDNEY INJURY) (HCC): ICD-10-CM

## 2023-04-26 DIAGNOSIS — A41.9 SEVERE SEPSIS (HCC): ICD-10-CM

## 2023-04-26 DIAGNOSIS — I48.91 ATRIAL FIBRILLATION (HCC): ICD-10-CM

## 2023-04-26 PROBLEM — R41.0 CONFUSION: Status: ACTIVE | Noted: 2023-04-26

## 2023-04-26 LAB
ALBUMIN SERPL BCP-MCNC: 3.8 G/DL (ref 3.5–5)
ALP SERPL-CCNC: 94 U/L (ref 34–104)
ALT SERPL W P-5'-P-CCNC: 23 U/L (ref 7–52)
ANION GAP SERPL CALCULATED.3IONS-SCNC: 10 MMOL/L (ref 4–13)
AST SERPL W P-5'-P-CCNC: 34 U/L (ref 13–39)
BACTERIA UR QL AUTO: ABNORMAL /HPF
BASE EXCESS BLDA CALC-SCNC: -5 MMOL/L (ref -2–3)
BILIRUB SERPL-MCNC: 0.57 MG/DL (ref 0.2–1)
BILIRUB UR QL STRIP: NEGATIVE
BUN SERPL-MCNC: 27 MG/DL (ref 5–25)
CA-I BLD-SCNC: 1.2 MMOL/L (ref 1.12–1.32)
CALCIUM SERPL-MCNC: 8.7 MG/DL (ref 8.4–10.2)
CHLORIDE SERPL-SCNC: 102 MMOL/L (ref 96–108)
CLARITY UR: ABNORMAL
CO2 SERPL-SCNC: 20 MMOL/L (ref 21–32)
COLOR UR: ABNORMAL
CREAT SERPL-MCNC: 2.1 MG/DL (ref 0.6–1.3)
ETHANOL SERPL-MCNC: <10 MG/DL
GFR SERPL CREATININE-BSD FRML MDRD: 21 ML/MIN/1.73SQ M
GLUCOSE SERPL-MCNC: 245 MG/DL (ref 65–140)
GLUCOSE SERPL-MCNC: 248 MG/DL (ref 65–140)
GLUCOSE UR STRIP-MCNC: NEGATIVE MG/DL
HCO3 BLDA-SCNC: 20.2 MMOL/L (ref 24–30)
HCT VFR BLD CALC: 33 % (ref 34.8–46.1)
HGB BLDA-MCNC: 11.2 G/DL (ref 11.5–15.4)
HGB UR QL STRIP.AUTO: ABNORMAL
KETONES UR STRIP-MCNC: NEGATIVE MG/DL
LEUKOCYTE ESTERASE UR QL STRIP: ABNORMAL
NITRITE UR QL STRIP: NEGATIVE
NON-SQ EPI CELLS URNS QL MICRO: ABNORMAL /HPF
PCO2 BLD: 21 MMOL/L (ref 21–32)
PCO2 BLD: 35.6 MM HG (ref 42–50)
PH BLD: 7.36 [PH] (ref 7.3–7.4)
PH UR STRIP.AUTO: 8 [PH]
PO2 BLD: 27 MM HG (ref 35–45)
POTASSIUM BLD-SCNC: 3.5 MMOL/L (ref 3.5–5.3)
POTASSIUM SERPL-SCNC: 3.4 MMOL/L (ref 3.5–5.3)
PROT SERPL-MCNC: 6.8 G/DL (ref 6.4–8.4)
PROT UR STRIP-MCNC: ABNORMAL MG/DL
RBC #/AREA URNS AUTO: ABNORMAL /HPF
SAO2 % BLD FROM PO2: 48 % (ref 60–85)
SODIUM BLD-SCNC: 136 MMOL/L (ref 136–145)
SODIUM SERPL-SCNC: 132 MMOL/L (ref 135–147)
SP GR UR STRIP.AUTO: 1.01 (ref 1–1.03)
SPECIMEN SOURCE: ABNORMAL
UROBILINOGEN UR STRIP-ACNC: <2 MG/DL
WBC #/AREA URNS AUTO: ABNORMAL /HPF

## 2023-04-26 RX ORDER — ROSUVASTATIN CALCIUM 40 MG/1
40 TABLET, COATED ORAL 2 TIMES DAILY
COMMUNITY
End: 2023-04-27

## 2023-04-26 RX ORDER — FOLIC ACID 1 MG/1
TABLET ORAL DAILY
COMMUNITY

## 2023-04-26 RX ORDER — AMLODIPINE BESYLATE 10 MG/1
10 TABLET ORAL DAILY
COMMUNITY

## 2023-04-26 RX ORDER — OMEGA-3S/DHA/EPA/FISH OIL/D3 300MG-1000
2000 CAPSULE ORAL DAILY
COMMUNITY

## 2023-04-26 RX ORDER — LOSARTAN POTASSIUM 100 MG/1
100 TABLET ORAL DAILY
COMMUNITY
End: 2023-04-29

## 2023-04-26 RX ADMIN — SODIUM CHLORIDE, SODIUM LACTATE, POTASSIUM CHLORIDE, AND CALCIUM CHLORIDE 1000 ML: .6; .31; .03; .02 INJECTION, SOLUTION INTRAVENOUS at 21:20

## 2023-04-27 PROBLEM — R53.1 GENERALIZED WEAKNESS: Status: ACTIVE | Noted: 2023-04-26

## 2023-04-27 PROBLEM — L40.59 POLYARTICULAR PSORIATIC ARTHRITIS (HCC): Status: ACTIVE | Noted: 2023-04-27

## 2023-04-27 PROBLEM — N18.9 ACUTE KIDNEY INJURY SUPERIMPOSED ON CKD (HCC): Status: ACTIVE | Noted: 2023-04-27

## 2023-04-27 PROBLEM — A41.50 SEPSIS DUE TO GRAM-NEGATIVE UTI: Status: ACTIVE | Noted: 2023-04-27

## 2023-04-27 PROBLEM — A41.9 SEVERE SEPSIS (HCC): Status: ACTIVE | Noted: 2023-04-27

## 2023-04-27 PROBLEM — E11.9 TYPE 2 DIABETES MELLITUS, WITHOUT LONG-TERM CURRENT USE OF INSULIN (HCC): Status: ACTIVE | Noted: 2023-04-27

## 2023-04-27 PROBLEM — I48.91 ATRIAL FIBRILLATION (HCC): Status: ACTIVE | Noted: 2023-04-27

## 2023-04-27 PROBLEM — R65.20 SEVERE SEPSIS (HCC): Status: ACTIVE | Noted: 2023-04-27

## 2023-04-27 PROBLEM — S22.080A COMPRESSION FRACTURE OF T11 VERTEBRA (HCC): Status: ACTIVE | Noted: 2023-04-27

## 2023-04-27 PROBLEM — N17.9 ACUTE KIDNEY INJURY SUPERIMPOSED ON CKD (HCC): Status: ACTIVE | Noted: 2023-04-27

## 2023-04-27 PROBLEM — N17.9 ACUTE KIDNEY INJURY SUPERIMPOSED ON CKD: Status: ACTIVE | Noted: 2023-04-27

## 2023-04-27 PROBLEM — N39.0 UTI (URINARY TRACT INFECTION): Status: ACTIVE | Noted: 2023-04-27

## 2023-04-27 PROBLEM — N18.9 ACUTE KIDNEY INJURY SUPERIMPOSED ON CKD: Status: ACTIVE | Noted: 2023-04-27

## 2023-04-27 PROBLEM — A41.50 SEPSIS DUE TO GRAM-NEGATIVE UTI (HCC): Status: ACTIVE | Noted: 2023-04-27

## 2023-04-27 PROBLEM — I50.32 CHRONIC DIASTOLIC HEART FAILURE (HCC): Status: ACTIVE | Noted: 2023-04-27

## 2023-04-27 LAB
ANION GAP SERPL CALCULATED.3IONS-SCNC: 8 MMOL/L (ref 4–13)
ATRIAL RATE: 82 BPM
BASOPHILS # BLD AUTO: 0.01 THOUSANDS/ÂΜL (ref 0–0.1)
BASOPHILS # BLD AUTO: 0.03 THOUSANDS/ÂΜL (ref 0–0.1)
BASOPHILS NFR BLD AUTO: 0 % (ref 0–1)
BASOPHILS NFR BLD AUTO: 0 % (ref 0–1)
BUN SERPL-MCNC: 28 MG/DL (ref 5–25)
CALCIUM SERPL-MCNC: 8.5 MG/DL (ref 8.4–10.2)
CHLORIDE SERPL-SCNC: 107 MMOL/L (ref 96–108)
CO2 SERPL-SCNC: 22 MMOL/L (ref 21–32)
CREAT SERPL-MCNC: 2.22 MG/DL (ref 0.6–1.3)
EOSINOPHIL # BLD AUTO: 0.01 THOUSAND/ÂΜL (ref 0–0.61)
EOSINOPHIL # BLD AUTO: 0.07 THOUSAND/ÂΜL (ref 0–0.61)
EOSINOPHIL NFR BLD AUTO: 0 % (ref 0–6)
EOSINOPHIL NFR BLD AUTO: 0 % (ref 0–6)
ERYTHROCYTE [DISTWIDTH] IN BLOOD BY AUTOMATED COUNT: 13.1 % (ref 11.6–15.1)
ERYTHROCYTE [DISTWIDTH] IN BLOOD BY AUTOMATED COUNT: 13.2 % (ref 11.6–15.1)
FLUAV RNA RESP QL NAA+PROBE: NEGATIVE
FLUBV RNA RESP QL NAA+PROBE: NEGATIVE
GFR SERPL CREATININE-BSD FRML MDRD: 19 ML/MIN/1.73SQ M
GLUCOSE SERPL-MCNC: 144 MG/DL (ref 65–140)
GLUCOSE SERPL-MCNC: 152 MG/DL (ref 65–140)
GLUCOSE SERPL-MCNC: 162 MG/DL (ref 65–140)
GLUCOSE SERPL-MCNC: 175 MG/DL (ref 65–140)
GLUCOSE SERPL-MCNC: 198 MG/DL (ref 65–140)
GLUCOSE SERPL-MCNC: 206 MG/DL (ref 65–140)
HCT VFR BLD AUTO: 32.3 % (ref 34.8–46.1)
HCT VFR BLD AUTO: 33 % (ref 34.8–46.1)
HGB BLD-MCNC: 10.6 G/DL (ref 11.5–15.4)
HGB BLD-MCNC: 10.8 G/DL (ref 11.5–15.4)
IMM GRANULOCYTES # BLD AUTO: 0.1 THOUSAND/UL (ref 0–0.2)
IMM GRANULOCYTES # BLD AUTO: 0.15 THOUSAND/UL (ref 0–0.2)
IMM GRANULOCYTES NFR BLD AUTO: 1 % (ref 0–2)
IMM GRANULOCYTES NFR BLD AUTO: 1 % (ref 0–2)
LACTATE SERPL-SCNC: 1.2 MMOL/L (ref 0.5–2)
LYMPHOCYTES # BLD AUTO: 0.89 THOUSANDS/ÂΜL (ref 0.6–4.47)
LYMPHOCYTES # BLD AUTO: 1.47 THOUSANDS/ÂΜL (ref 0.6–4.47)
LYMPHOCYTES NFR BLD AUTO: 11 % (ref 14–44)
LYMPHOCYTES NFR BLD AUTO: 5 % (ref 14–44)
MCH RBC QN AUTO: 31.9 PG (ref 26.8–34.3)
MCH RBC QN AUTO: 31.9 PG (ref 26.8–34.3)
MCHC RBC AUTO-ENTMCNC: 32.7 G/DL (ref 31.4–37.4)
MCHC RBC AUTO-ENTMCNC: 32.8 G/DL (ref 31.4–37.4)
MCV RBC AUTO: 97 FL (ref 82–98)
MCV RBC AUTO: 97 FL (ref 82–98)
MONOCYTES # BLD AUTO: 0.92 THOUSAND/ÂΜL (ref 0.17–1.22)
MONOCYTES # BLD AUTO: 1.22 THOUSAND/ÂΜL (ref 0.17–1.22)
MONOCYTES NFR BLD AUTO: 5 % (ref 4–12)
MONOCYTES NFR BLD AUTO: 9 % (ref 4–12)
NEUTROPHILS # BLD AUTO: 11.22 THOUSANDS/ÂΜL (ref 1.85–7.62)
NEUTROPHILS # BLD AUTO: 16.89 THOUSANDS/ÂΜL (ref 1.85–7.62)
NEUTS SEG NFR BLD AUTO: 79 % (ref 43–75)
NEUTS SEG NFR BLD AUTO: 89 % (ref 43–75)
NRBC BLD AUTO-RTO: 0 /100 WBCS
NRBC BLD AUTO-RTO: 0 /100 WBCS
P AXIS: 16 DEGREES
PLATELET # BLD AUTO: 100 THOUSANDS/UL (ref 149–390)
PLATELET # BLD AUTO: 99 THOUSANDS/UL (ref 149–390)
PMV BLD AUTO: 10.2 FL (ref 8.9–12.7)
PMV BLD AUTO: 9.9 FL (ref 8.9–12.7)
POTASSIUM SERPL-SCNC: 3.1 MMOL/L (ref 3.5–5.3)
PR INTERVAL: 166 MS
PROCALCITONIN SERPL-MCNC: 2.63 NG/ML
PROCALCITONIN SERPL-MCNC: 9.26 NG/ML
QRS AXIS: -29 DEGREES
QRSD INTERVAL: 142 MS
QT INTERVAL: 436 MS
QTC INTERVAL: 509 MS
RBC # BLD AUTO: 3.32 MILLION/UL (ref 3.81–5.12)
RBC # BLD AUTO: 3.39 MILLION/UL (ref 3.81–5.12)
RSV RNA RESP QL NAA+PROBE: NEGATIVE
SARS-COV-2 RNA RESP QL NAA+PROBE: NEGATIVE
SODIUM SERPL-SCNC: 137 MMOL/L (ref 135–147)
T WAVE AXIS: 108 DEGREES
VENTRICULAR RATE: 82 BPM
WBC # BLD AUTO: 14.03 THOUSAND/UL (ref 4.31–10.16)
WBC # BLD AUTO: 18.95 THOUSAND/UL (ref 4.31–10.16)

## 2023-04-27 RX ORDER — PILOCARPINE HYDROCHLORIDE 40 MG/ML
1 SOLUTION/ DROPS OPHTHALMIC 4 TIMES DAILY
COMMUNITY

## 2023-04-27 RX ORDER — SODIUM CHLORIDE, SODIUM LACTATE, POTASSIUM CHLORIDE, CALCIUM CHLORIDE 600; 310; 30; 20 MG/100ML; MG/100ML; MG/100ML; MG/100ML
50 INJECTION, SOLUTION INTRAVENOUS CONTINUOUS
Status: DISCONTINUED | OUTPATIENT
Start: 2023-04-27 | End: 2023-04-28

## 2023-04-27 RX ORDER — INSULIN LISPRO 100 [IU]/ML
1-6 INJECTION, SOLUTION INTRAVENOUS; SUBCUTANEOUS
Status: CANCELLED | OUTPATIENT
Start: 2023-04-27

## 2023-04-27 RX ORDER — MELATONIN
2000 DAILY
Status: DISCONTINUED | OUTPATIENT
Start: 2023-04-27 | End: 2023-04-29 | Stop reason: HOSPADM

## 2023-04-27 RX ORDER — TIMOLOL MALEATE 5 MG/ML
1 SOLUTION/ DROPS OPHTHALMIC 2 TIMES DAILY
Status: DISCONTINUED | OUTPATIENT
Start: 2023-04-27 | End: 2023-04-29 | Stop reason: HOSPADM

## 2023-04-27 RX ORDER — AMLODIPINE BESYLATE 5 MG/1
10 TABLET ORAL DAILY
Status: DISCONTINUED | OUTPATIENT
Start: 2023-04-27 | End: 2023-04-29 | Stop reason: HOSPADM

## 2023-04-27 RX ORDER — ATORVASTATIN CALCIUM 40 MG/1
40 TABLET, FILM COATED ORAL DAILY
COMMUNITY

## 2023-04-27 RX ORDER — FOLIC ACID 1 MG/1
1 TABLET ORAL DAILY
Status: DISCONTINUED | OUTPATIENT
Start: 2023-04-27 | End: 2023-04-29 | Stop reason: HOSPADM

## 2023-04-27 RX ORDER — INSULIN LISPRO 100 [IU]/ML
1-5 INJECTION, SOLUTION INTRAVENOUS; SUBCUTANEOUS
Status: DISCONTINUED | OUTPATIENT
Start: 2023-04-27 | End: 2023-04-27

## 2023-04-27 RX ORDER — ATORVASTATIN CALCIUM 40 MG/1
40 TABLET, FILM COATED ORAL DAILY
Status: DISCONTINUED | OUTPATIENT
Start: 2023-04-27 | End: 2023-04-29 | Stop reason: HOSPADM

## 2023-04-27 RX ORDER — INSULIN LISPRO 100 [IU]/ML
1-5 INJECTION, SOLUTION INTRAVENOUS; SUBCUTANEOUS
Status: DISCONTINUED | OUTPATIENT
Start: 2023-04-27 | End: 2023-04-29 | Stop reason: HOSPADM

## 2023-04-27 RX ORDER — DIAPER,BRIEF,ADULT, DISPOSABLE
1200 EACH MISCELLANEOUS
COMMUNITY

## 2023-04-27 RX ORDER — ACETAMINOPHEN 325 MG/1
650 TABLET ORAL EVERY 6 HOURS PRN
Status: DISCONTINUED | OUTPATIENT
Start: 2023-04-27 | End: 2023-04-29 | Stop reason: HOSPADM

## 2023-04-27 RX ORDER — INSULIN LISPRO 100 [IU]/ML
1-6 INJECTION, SOLUTION INTRAVENOUS; SUBCUTANEOUS
Status: DISCONTINUED | OUTPATIENT
Start: 2023-04-27 | End: 2023-04-29 | Stop reason: HOSPADM

## 2023-04-27 RX ADMIN — INSULIN LISPRO 1 UNITS: 100 INJECTION, SOLUTION INTRAVENOUS; SUBCUTANEOUS at 22:12

## 2023-04-27 RX ADMIN — FOLIC ACID 1 MG: 1 TABLET ORAL at 08:04

## 2023-04-27 RX ADMIN — CEFTRIAXONE SODIUM 1000 MG: 10 INJECTION, POWDER, FOR SOLUTION INTRAVENOUS at 00:57

## 2023-04-27 RX ADMIN — BIMATOPROST 1 DROP: 0.1 SOLUTION/ DROPS OPHTHALMIC at 22:12

## 2023-04-27 RX ADMIN — INSULIN LISPRO 1 UNITS: 100 INJECTION, SOLUTION INTRAVENOUS; SUBCUTANEOUS at 17:09

## 2023-04-27 RX ADMIN — SODIUM CHLORIDE, SODIUM LACTATE, POTASSIUM CHLORIDE, AND CALCIUM CHLORIDE 50 ML/HR: .6; .31; .03; .02 INJECTION, SOLUTION INTRAVENOUS at 02:52

## 2023-04-27 RX ADMIN — Medication 2000 UNITS: at 08:04

## 2023-04-27 RX ADMIN — TIMOLOL MALEATE 1 DROP: 5 SOLUTION/ DROPS OPHTHALMIC at 17:09

## 2023-04-27 RX ADMIN — APIXABAN 2.5 MG: 2.5 TABLET, FILM COATED ORAL at 08:04

## 2023-04-27 RX ADMIN — APIXABAN 2.5 MG: 2.5 TABLET, FILM COATED ORAL at 17:09

## 2023-04-27 RX ADMIN — INSULIN LISPRO 1 UNITS: 100 INJECTION, SOLUTION INTRAVENOUS; SUBCUTANEOUS at 02:54

## 2023-04-27 RX ADMIN — INSULIN LISPRO 1 UNITS: 100 INJECTION, SOLUTION INTRAVENOUS; SUBCUTANEOUS at 08:05

## 2023-04-27 RX ADMIN — SODIUM CHLORIDE, SODIUM LACTATE, POTASSIUM CHLORIDE, AND CALCIUM CHLORIDE 50 ML/HR: .6; .31; .03; .02 INJECTION, SOLUTION INTRAVENOUS at 22:38

## 2023-04-27 RX ADMIN — TIMOLOL MALEATE 1 DROP: 5 SOLUTION/ DROPS OPHTHALMIC at 08:15

## 2023-04-27 RX ADMIN — CEFTRIAXONE SODIUM 1000 MG: 10 INJECTION, POWDER, FOR SOLUTION INTRAVENOUS at 23:54

## 2023-04-27 NOTE — PLAN OF CARE
Problem: Prexisting or High Potential for Compromised Skin Integrity  Goal: Skin integrity is maintained or improved  Description: INTERVENTIONS:  - Identify patients at risk for skin breakdown  - Assess and monitor skin integrity  - Assess and monitor nutrition and hydration status  - Monitor labs   - Assess for incontinence   - Turn and reposition patient  - Assist with mobility/ambulation  - Relieve pressure over bony prominences  - Avoid friction and shearing  - Provide appropriate hygiene as needed including keeping skin clean and dry  - Evaluate need for skin moisturizer/barrier cream  - Collaborate with interdisciplinary team   - Patient/family teaching  - Consider wound care consult   Outcome: Progressing     Problem: MOBILITY - ADULT  Goal: Maintain or return to baseline ADL function  Description: INTERVENTIONS:  -  Assess patient's ability to carry out ADLs; assess patient's baseline for ADL function and identify physical deficits which impact ability to perform ADLs (bathing, care of mouth/teeth, toileting, grooming, dressing, etc )  - Assess/evaluate cause of self-care deficits   - Assess range of motion  - Assess patient's mobility; develop plan if impaired  - Assess patient's need for assistive devices and provide as appropriate  - Encourage maximum independence but intervene and supervise when necessary  - Involve family in performance of ADLs  - Assess for home care needs following discharge   - Consider OT consult to assist with ADL evaluation and planning for discharge  - Provide patient education as appropriate  Outcome: Progressing  Goal: Maintains/Returns to pre admission functional level  Description: INTERVENTIONS:  - Perform BMAT or MOVE assessment daily    - Set and communicate daily mobility goal to care team and patient/family/caregiver     - Collaborate with rehabilitation services on mobility goals if consulted    - Out of bed for toileting  - Record patient progress and toleration of activity level   Outcome: Progressing     Problem: PAIN - ADULT  Goal: Verbalizes/displays adequate comfort level or baseline comfort level  Description: Interventions:  - Encourage patient to monitor pain and request assistance  - Assess pain using appropriate pain scale  - Administer analgesics based on type and severity of pain and evaluate response  - Implement non-pharmacological measures as appropriate and evaluate response  - Consider cultural and social influences on pain and pain management  - Notify physician/advanced practitioner if interventions unsuccessful or patient reports new pain  Outcome: Progressing     Problem: INFECTION - ADULT  Goal: Absence or prevention of progression during hospitalization  Description: INTERVENTIONS:  - Assess and monitor for signs and symptoms of infection  - Monitor lab/diagnostic results  - Monitor all insertion sites, i e  indwelling lines, tubes, and drains  - Monitor endotracheal if appropriate and nasal secretions for changes in amount and color  - [place] appropriate cooling/warming therapies per order  - Administer medications as ordered  - Instruct and encourage patient and family to use good hand hygiene technique  - Identify and instruct in appropriate isolation precautions for identified infection/condition  Outcome: Progressing  Goal: Absence of fever/infection during neutropenic period  Description: INTERVENTIONS:  - Monitor WBC    Outcome: Progressing     Problem: SAFETY ADULT  Goal: Maintain or return to baseline ADL function  Description: INTERVENTIONS:  -  Assess patient's ability to carry out ADLs; assess patient's baseline for ADL function and identify physical deficits which impact ability to perform ADLs (bathing, care of mouth/teeth, toileting, grooming, dressing, etc )  - Assess/evaluate cause of self-care deficits   - Assess range of motion  - Assess patient's mobility; develop plan if impaired  - Assess patient's need for assistive devices and provide as appropriate  - Encourage maximum independence but intervene and supervise when necessary  - Involve family in performance of ADLs  - Assess for home care needs following discharge   - Consider OT consult to assist with ADL evaluation and planning for discharge  - Provide patient education as appropriate  Outcome: Progressing  Goal: Maintains/Returns to pre admission functional level  Description: INTERVENTIONS:  - Perform BMAT or MOVE assessment daily    - Set and communicate daily mobility goal to care team and patient/family/caregiver  - Collaborate with rehabilitation services on mobility goals if consulted    - Reposition patient every 2 hours      - Out of bed for toileting  - Record patient progress and toleration of activity level   Outcome: Progressing  Goal: Patient will remain free of falls  Description: INTERVENTIONS:  - Educate patient/family on patient safety including physical limitations  - Instruct patient to call for assistance with activity   - Consult OT/PT to assist with strengthening/mobility   - Keep Call bell within reach  - Keep bed low and locked with side rails adjusted as appropriate  - Keep care items and personal belongings within reach  - Initiate and maintain comfort rounds  - Make Fall Risk Sign visible to staff  - Offer Toileting every 2 Hours, in advance of need  - Initiate/Maintain bed alarm    - Apply yellow socks and bracelet for high fall risk patients  - Consider moving patient to room near nurses station  Outcome: Progressing     Problem: DISCHARGE PLANNING  Goal: Discharge to home or other facility with appropriate resources  Description: INTERVENTIONS:  - Identify barriers to discharge w/patient and caregiver  - Arrange for needed discharge resources and transportation as appropriate  - Identify discharge learning needs (meds, wound care, etc )  - Arrange for interpretive services to assist at discharge as needed  - Refer to Case Management Department for coordinating discharge planning if the patient needs post-hospital services based on physician/advanced practitioner order or complex needs related to functional status, cognitive ability, or social support system  Outcome: Progressing     Problem: Knowledge Deficit  Goal: Patient/family/caregiver demonstrates understanding of disease process, treatment plan, medications, and discharge instructions  Description: Complete learning assessment and assess knowledge base    Interventions:  - Provide teaching at level of understanding  - Provide teaching via preferred learning methods  Outcome: Progressing

## 2023-04-27 NOTE — QUICK NOTE
Cervical Collar Clearance: The patient had a CT scan of the cervical spine demonstrating no acute injury  On exam, the patient had no midline point tenderness or paresthesias/numbness/weakness in the extremities  The patient had full range of motion (was then able to flex, extend, and rotate head laterally) without pain  There were no distracting injuries and the patient was not intoxicated  The patient's cervical spine was cleared radiologically and clinically  Cervical collar removed at this time       Jc Deluna,   4/26/2023 10:32 PM

## 2023-04-27 NOTE — PROGRESS NOTES
"[facility]  Progress Note  Name: Mili Saldivar  MRN: [id number]  Unit/Bed#: S -01 I Date of Admission: 4/26/2023   Date of Service: 4/27/2023 I Hospital Day: 0    Assessment/Plan   Generalized weakness  Assessment & Plan  · Patient reportedly fell twice today, requiring help to get up  · She reports a history of generalized weakness in her legs, requiring use of a walker, however reports that she felt more fatigued and weak than normal causing her to fall twice  TRAUMA TERTIARY SURVEY NOTE    VTE Prophylaxis: Eliquis  Disposition: per SLIM    Code status:  Level 1 - Full Code    Consultants: IP CONSULT TO CASE MANAGEMENT    Subjective   Transfer from: not a transfer    Mechanism of Injury:Fall     Chief Complaint: \"when can I leave\"    HPI/Last 24 hour events:   Patient presented after fall and was admitted to [facility] for AIMEE, weakness and sepsis  She denies chest, abdominal or joint pain  She states she is feeling better and wants to know when she will be able to leave  I deferred to SLIM  She has no complaints          Objective   Vitals:   Temp:  [98 °F (36 7 °C)-99 3 °F (37 4 °C)] 98 °F (36 7 °C)  HR:  [] 73  Resp:  [16-18] 16  BP: (112-163)/(52-73) 123/52    I/O       04/25 0701  04/26 0700 04/26 0701  04/27 0700 04/27 0701  04/28 0700    IV Piggyback  1000     Total Intake(mL/kg)  1000 (13 8)     Urine (mL/kg/hr)  700     Total Output  700     Net  +300                   Physical Exam:   GENERAL APPEARANCE: NAD  NEURO: GCS 15  HEENT: Normocephalic  CV: RRR, dorsalis pedis palpable  LUNGS: CTA b/l  GI: Non-tender, non-distended  : no day  MSK: moving all extremities, no joint or bone tenderness  SKIN: warm, dry, intact      Invasive Devices     Peripheral Intravenous Line  Duration           Peripheral IV 04/26/23 Left Antecubital <1 day                     Lab Results:   BMP/CMP:   Lab Results   Component Value Date    SODIUM 137 04/27/2023    K 3 1 (L) " 04/27/2023     04/27/2023    CO2 22 04/27/2023    CO2 21 04/26/2023    BUN 28 (H) 04/27/2023    CREATININE 2 22 (H) 04/27/2023    GLUCOSE 248 (H) 04/26/2023    CALCIUM 8 5 04/27/2023    AST 34 04/26/2023    ALT 23 04/26/2023    ALKPHOS 94 04/26/2023    EGFR 19 04/27/2023    and CBC:   Lab Results   Component Value Date    WBC 14 03 (H) 04/27/2023    HGB 10 6 (L) 04/27/2023    HCT 32 3 (L) 04/27/2023    MCV 97 04/27/2023    PLT 99 (L) 04/27/2023    MCH 31 9 04/27/2023    MCHC 32 8 04/27/2023    RDW 13 1 04/27/2023    MPV 9 9 04/27/2023    NRBC 0 04/27/2023       Imaging Results: I have personally reviewed pertinent reports      Chest Xray(s): negative for acute findings   FAST exam(s): negative for acute findings   CT Scan(s): negative for acute findings   Additional Xray(s): N/A     Other Studies: none

## 2023-04-27 NOTE — PHYSICAL THERAPY NOTE
PHYSICAL THERAPY EVALUATION NOTE    Patient Name: Willie Carrion  VMNJG'P Date: 4/27/2023  AGE:   79 y o  Mrn:   [id number]  ADMIT DX:  AIMEE (acute kidney injury) ([facility] [address] ) [N17 9]  Generalized weakness [R53 1]    No past medical history on file  Length Of Stay: 0  PHYSICAL THERAPY EVALUATION :   04/27/23 1320   PT Last Visit   PT Visit Date 04/27/23   Pain Assessment   Pain Assessment Tool FLACC   Pain Location/Orientation Other (Comment)  (pt was unable to provide specific location of pain)   Hospital Pain Intervention(s) Repositioned; Ambulation/increased activity   Pain Rating: FLACC (Rest) - Face 0   Pain Rating: FLACC (Rest) - Legs 0   Pain Rating: FLACC (Rest) - Activity 0   Pain Rating: FLACC (Rest) - Cry 0   Pain Rating: FLACC (Rest) - Consolability 0   Score: FLACC (Rest) 0   Pain Rating: FLACC (Activity) - Face 1   Pain Rating: FLACC (Activity) - Legs 0   Pain Rating: FLACC (Activity) - Activity 0   Pain Rating: FLACC (Activity) - Cry 1   Pain Rating: FLACC (Activity) - Consolability 1   Score: FLACC (Activity) 3   Restrictions/Precautions   Other Precautions Cognitive; Chair Alarm; Bed Alarm;Multiple lines; Fall Risk;Pain   Home Living   Type of [address] [place] [address] One level  ([address])   Additional Comments lives w/ spouse  ambulates w/ rollator  unable to utilize rollator in bathroom due to small size  independent w/ ADLs, though recently daughter assisted w/ bathing  2 falls in last 6 months  spouse assists w/ IADLs  General   Additional Pertinent History 4/26/23 at 21:19 glucose was 245   Family/Caregiver Present No   Cognition   Arousal/Participation Cooperative   Orientation Level Oriented to person; Other (Comment)  (pt was idenitified w/ full name, ID bracelet)   Following Commands Follows one step commands with increased time or repetition   Subjective   Subjective pt seen supine in bed   agreed to PT eval  pt denied pain  input was needed for task focus  RLE Assessment   RLE Assessment WFL  (3+ to 4-/5)   LLE Assessment   LLE Assessment WFL  (3+ to 4-/5)   Light Touch   RLE Light Touch Grossly intact   LLE Light Touch Grossly intact   Bed Mobility   Supine to Sit 4  Minimal assistance   Additional items Assist x 1;HOB elevated; Increased time required   Transfers   Sit to Stand 5  Supervision   Additional items Increased time required   Stand to Sit 5  Supervision  (poorly controlled descent to sitting surface)   Additional items Verbal cues  (for body positioning, safety)   Ambulation/Elevation   Gait pattern Foward flexed; Short stride; Excessively slow   Gait Assistance 5  Supervision   Additional items Verbal cues  (for walker positioning)   Assistive Device Rolling walker   Distance 5 feet  (additional was possible due to fatigue)   Stair Management Assistance Not tested  (due to limited ambulation tolerance)   Balance   Static Sitting Fair +   Static Standing Fair +  (w/ roller walker)   Ambulatory Fair -  (w/ roller walker)   Activity Tolerance   Activity Tolerance Patient limited by fatigue   Nurse Made Aware spoke to Bennet Holter, Dr Dellis Columbia CM, Hanane MAHMOOD   Assessment   Problem List Decreased strength;Decreased endurance; Impaired balance;Decreased mobility; Decreased safety awareness;Pain;Decreased cognition   Assessment Pt presents after falling twice at home  Dx: generalized weakness, UTI, AIMEE on CKD, T11 compression fx, a-fib, DM, and psoriatric arthritis  order placed for PT eval and tx, w/ activity order of up and out of bed as tolerated  pt presents w/ comorbidities of DM, a-fib, HTN, hyperlipidemia, CVA, and psoriatic arthritis and personal factors of advanced age, mobilizing w/ assistive device, stair(s) to enter home, decreased cognition, positive fall history, inability to perform IADLs and inability to perform ADLs   pt presents w/ pain, weakness, decreased endurance, impaired cognition, impaired balance, gait deviations, decreased safety awareness and fall risk  these impairments are evident in findings from physical examination (weakness), mobility assessment (need for standby to min assist w/ all phases of mobility when usually mobilizing independently, tolerance to only 5 feet of ambulation and need for cueing for mobility technique), and Barthel Index: 50/100  pt needed input for task focus and mobility technique  pt is at risk for falls due to physical and safety awareness deficits  pt's clinical presentation is unstable/unpredictable (evident in poor blood sugar control, need for assist w/ all phases of mobility when usually mobilizing independently, tolerance to only 5 feet of ambulation and need for input for task focus and mobility technique)  pt needs inpatient PT tx to improve mobility deficits and progress mobility training as appropriate  discharge recommendation is for home w/ family support and outpatient PT to reduce fall risk and maximize level of functional independence  pt would benefit from Gerontology consult to address cognition and aging related issues  Goals   Patient Goals go home   STG Expiration Date 05/07/23   Short Term Goal #1 pt will:  Increase bilateral LE strength 1/2 grade to facilitate independent mobility, Perform bed mobility independently to increase level of independence, Perform all transfers independently to improve independence, Ambulate 200 ft  with roller walker modified independent w/o LOB to improve functional independence, Navigate 1 stair(s) w/ supervision with unilateral handrail to facilitate return to previous living environment, Increase ambulatory balance 1 grade to decrease risk for falls, Tolerate 3 hr OOB to faciliate upright tolerance, Improve Barthel Index score to 70 or greater to facilitate independence and Complete Timed Up and Go or Comfortable Gait Speed to further assess mobility and monitor progress   PT Treatment Day 1   Plan Treatment/Interventions Functional transfer training;LE strengthening/ROM; Elevations; Therapeutic exercise; Endurance training;Cognitive reorientation;Patient/family training;Equipment eval/education; Bed mobility;Gait training   PT Frequency 3-5x/wk   Recommendation   PT Discharge Recommendation Home with outpatient rehabilitation   Additional Comments (S)  pt would benefit from Gerontology consult to address cognition and aging related issues  AM-PAC Basic Mobility Inpatient   Turning in Flat Bed Without Bedrails 4   Lying on Back to Sitting on Edge of Flat Bed Without Bedrails 3   Moving Bed to Chair 3   Standing Up From Chair Using Arms 3   Walk in Room 3   Climb 3-5 Stairs With Railing 2   Basic Mobility Inpatient Raw Score 18   Basic Mobility Standardized Score 41 05   Highest Level Of Mobility   JH-HLM Goal 6: Walk 10 steps or more   JH-HLM Achieved 7: Walk 25 feet or more   Barthel Index   Feeding 10   Bathing 0   Grooming Score 5   Dressing Score 5   Bladder Score 5   Bowels Score 10   Toilet Use Score 5   Transfers (Bed/Chair) Score 10   Mobility (Level Surface) Score 0   Stairs Score 0   Barthel Index Score 50   Additional Treatment Session   Start Time 1320   End Time 1330   Treatment Assessment Pt agreed to participate in additional ambulation to address physical and mobility deficits noted during eval  Sit < - > stand transfer w/ supervision  Ambulated 30 feet x2 w/ roller walker w/ supervision  seated rest break was required  Additional ambulation was not possible due to fatigue  Therapist provided education to pt including walker management and transfer technique  Pt shows improvement w/ increased activity tolerance but continued to need the same level of assistance  Further inpatient PT intervention is necessary to decrease fall risk and maximize functional independence     Equipment Use roller walker   Additional Treatment Day 1   End of Consult   Patient Position at End of Consult Bedside chair;Bed/Chair alarm activated; All needs within reach   End of Consult Comments (S)  prior to session [id number] OT contacted Dr Giuseppe Navarro via [employer] regarding T11 fx - Dr  stated pt does not need brace or Neurosurgery consult  also stated pt is able to mobilize  The patient's AM-PAC Basic Mobility Inpatient Short Form Raw Score is 18  A Raw score of greater than 16 suggests the patient may benefit from discharge to home  Please also refer to the recommendation of the Physical Therapist for safe discharge planning  Skilled PT recommended while in hospital and upon DC to progress pt toward treatment goals       Evelyn Antonio, PT

## 2023-04-27 NOTE — H&P
[facility]  H&P  Name: Abdirizak Coombs 79 y o  female I MRN: [id number]  Unit/Bed#: ED-07 I Date of Admission: 4/26/2023   Date of Service: 4/27/2023 I Hospital Day: 0      Assessment/Plan    - Fall  - AIMEE    Plan:  - No evidence of acute injury on exam or imaging  - Patient reports fatigue and fever this morning, along with worsening of chronic leg weakness  - Found to have AIMEE on laboratory evaluation  - Admit to [facility] for further evaluation and treatment as necessary  - We will perform tertiary exam 4/27         Trauma Alert: Level B   Model of Arrival: Ambulance    Trauma Team: Attending Gregory Graham and Residents Randee Jaime  Consultants:     None     History of Present Illness     Chief Complaint: fall, mild confusion  Mechanism:Fall     HPI:    Abdirizak Coombs is a 79 y o  female with diabetes, atrial fibrillation, hypertension, and psoriatic arthritis who presents after falling twice at home today  Family reports that she normally uses a walker to get around due to chronic, generalized weakness, however reports that today her weakness became much worse  She reports having chills and subjective fever earlier in the day both of which resolved prior to arrival   She denies any chest pain, shortness of breath, nausea, vomiting, diarrhea, urinary symptoms  Notably her  reported having high fever and fatigue over the last 2 days  She denies any other recent falls or injuries  Review of Systems   Constitutional: Positive for chills and fever  HENT: Negative for congestion and rhinorrhea  Eyes: Negative for pain, redness and visual disturbance  Respiratory: Negative for chest tightness and shortness of breath  Cardiovascular: Negative for chest pain and palpitations  Gastrointestinal: Negative for abdominal pain, constipation, diarrhea, nausea and vomiting  Genitourinary: Negative for difficulty urinating, dysuria and hematuria     Musculoskeletal: Negative for arthralgias, back pain and neck pain  Skin: Negative for rash and wound  Neurological: Positive for weakness (chronic & generalized, worse over the last day)  Negative for dizziness, light-headedness, numbness and headaches  Psychiatric/Behavioral: Positive for confusion (mild)  12-point, complete review of systems was reviewed and negative except as stated above  Historical Information   -Diabetes mellitus  -Atrial fibrillation  -Psoriatic arthritis  -Hypertension  -Hyperlipidemia  -History of CVA    -Cataract extraction  -Hemorrhoidectomy         There is no immunization history on file for this patient  Last Tetanus: unknown  Family History: Non-contributory    1  Before the illness or injury that brought you to the Emergency, did you need someone to help you on a regular basis? 0=No   2  Since the illness or injury that brought you to the Emergency, have you needed more help than usual to take care of yourself? 1=Yes   3  Have you been hospitalized for one or more nights during the past 6 months (excluding a stay in the Emergency Department)? 0=No   4  In general, do you see well? 0=Yes   5  In general, do you have serious problems with your memory? 0=No   6  Do you take more than three different medications everyday? 1=Yes   TOTAL   2     Did you order a geriatric consult if the score was 2 or greater?: yes     Meds/Allergies   all current active meds have been reviewed and PTA meds:   Prior to Admission Medications   Prescriptions Last Dose Informant Patient Reported? Taking?    amLODIPine (NORVASC) 10 mg tablet   Yes Yes   Sig: Take 10 mg by mouth daily   apixaban (ELIQUIS) 5 mg   Yes Yes   Sig: Take 5 mg by mouth 2 (two) times a day   cholecalciferol (VITAMIN D3) 400 units tablet   Yes Yes   Sig: Take 400 Units by mouth daily   folic acid (FOLVITE) 1 mg tablet   Yes Yes   Sig: Take by mouth daily   losartan (COZAAR) 100 MG tablet   Yes Yes   Sig: Take 100 mg by mouth daily   metFORMIN (GLUCOPHAGE) 500 mg tablet   Yes Yes   Sig: Take 500 mg by mouth daily with breakfast   rosuvastatin (CRESTOR) 40 MG tablet   Yes Yes   Sig: Take 40 mg by mouth 2 (two) times a day      Facility-Administered Medications: None     Allergies have not been reviewed; Not on File    Objective   Initial Vitals:   Temperature: 99 3 °F (37 4 °C) (04/26/23 2046)  Pulse: 104 (04/26/23 2046)  Respirations: 18 (04/26/23 2046)  Blood Pressure: 163/70 (04/26/23 2046)    Primary Survey:   Airway:        Status: patent; Hospital Interventions: none  Breathing:        Pre-hospital Interventions: none       Effort: normal       Right breath sounds: normal       Left breath sounds: normal  Circulation:        Rhythm: regular       Rate: regular   Right Pulses Left Pulses    R radial: 2+    R pedal: 2+     L radial: 2+    L pedal: 2+       Disability:        GCS: Eye: 4; Verbal: 5 Motor: 6 Total: 15       Right Pupil: 3 mm;  round;  reactive         Left Pupil:  3 mm;  round;  reactive      R Motor Strength L Motor Strength             Sensory:  No sensory deficit  Exposure:       Completed: Yes      Secondary Survey:  Physical Exam  Vitals reviewed  Constitutional:       General: She is not in acute distress  Appearance: Normal appearance  She is not ill-appearing or toxic-appearing  HENT:      Head: Normocephalic and atraumatic  Right Ear: External ear normal       Left Ear: External ear normal       Nose: Nose normal  No congestion  Mouth/Throat:      Mouth: Mucous membranes are moist       Pharynx: Oropharynx is clear  No oropharyngeal exudate or posterior oropharyngeal erythema  Eyes:      General:         Right eye: No discharge  Left eye: No discharge  Extraocular Movements: Extraocular movements intact  Conjunctiva/sclera: Conjunctivae normal       Pupils: Pupils are equal, round, and reactive to light  Cardiovascular:      Rate and Rhythm: Normal rate and regular rhythm  Pulses: Normal pulses  Heart sounds: Normal heart sounds  No murmur heard  Pulmonary:      Effort: Pulmonary effort is normal  No respiratory distress  Breath sounds: Normal breath sounds  No wheezing or rhonchi  Abdominal:      General: Abdomen is flat  Bowel sounds are normal  There is no distension  Palpations: Abdomen is soft  There is no mass  Tenderness: There is no abdominal tenderness  There is no guarding or rebound  Musculoskeletal:         General: Tenderness (mild, lower lumbar spine) present  No swelling or deformity  Normal range of motion  Cervical back: Normal range of motion and neck supple  No rigidity or tenderness  Right lower leg: No edema  Left lower leg: No edema  Skin:     General: Skin is warm and dry  Capillary Refill: Capillary refill takes less than 2 seconds  Neurological:      Mental Status: She is alert and oriented to person, place, and time  Cranial Nerves: No cranial nerve deficit  Sensory: No sensory deficit  Motor: No weakness           Invasive Devices     Peripheral Intravenous Line  Duration           Peripheral IV 04/26/23 Left Antecubital <1 day               Lab Results: Results: I have personally reviewed all pertinent laboratory/tests results  Results Reviewed     Procedure Component Value Units Date/Time    Blood culture [[id number]]     Lab Status: No result Specimen: Blood     Blood culture [[id number]]     Lab Status: No result Specimen: Blood     Lactic acid, plasma (w/reflex if result > 2 0) [[id number]]     Lab Status: No result Specimen: Blood     CBC and differential [[id number]]     Lab Status: No result Specimen: Blood     Urine Microscopic [[id number]]  (Abnormal) Collected: 04/26/23 2251    Lab Status: Final result Specimen: Urine, Clean Catch Updated: 04/26/23 2325     RBC, UA 10-20 /hpf      WBC, UA Innumerable /hpf      Epithelial Cells Occasional /hpf      Bacteria, UA Occasional /hpf     Urine culture [[id number]] Collected: 04/26/23 2251    Lab Status:  In process Specimen: Urine, Clean Catch Updated: 04/26/23 2325    UA w Reflex to Microscopic w Reflex to Culture [[id number]]  (Abnormal) Collected: 04/26/23 2251    Lab Status: Final result Specimen: Urine, Clean Catch Updated: 04/26/23 2301     Color, UA Light Orange     Clarity, UA Extra Turbid     Specific Gravity, UA 1 009     pH, UA 8 0     Leukocytes, UA Large     Nitrite, UA Negative     Protein,  (2+) mg/dl      Glucose, UA Negative mg/dl      Ketones, UA Negative mg/dl      Urobilinogen, UA <2 0 mg/dl      Bilirubin, UA Negative     Occult Blood, UA Large    Ethanol [[id number]]  (Normal) Collected: 04/26/23 2119    Lab Status: Final result Specimen: Blood from Arm, Left Updated: 04/26/23 2149     Ethanol Lvl <10 mg/dL     Comprehensive metabolic panel [[id number]]  (Abnormal) Collected: 04/26/23 2119    Lab Status: Final result Specimen: Blood from Arm, Left Updated: 04/26/23 2149     Sodium 132 mmol/L      Potassium 3 4 mmol/L      Chloride 102 mmol/L      CO2 20 mmol/L      ANION GAP 10 mmol/L      BUN 27 mg/dL      Creatinine 2 10 mg/dL      Glucose 245 mg/dL      Calcium 8 7 mg/dL      AST 34 U/L      ALT 23 U/L      Alkaline Phosphatase 94 U/L      Total Protein 6 8 g/dL      Albumin 3 8 g/dL      Total Bilirubin 0 57 mg/dL      eGFR 21 ml/min/1 73sq m     Narrative:      [place] guidelines for Chronic Kidney Disease (CKD):     Stage 1 with normal or high GFR (GFR > 90 mL/min/1 73 square meters)    Stage 2 Mild CKD (GFR = 60-89 mL/min/1 73 square meters)    Stage 3A Moderate CKD (GFR = 45-59 mL/min/1 73 square meters)    Stage 3B Moderate CKD (GFR = 30-44 mL/min/1 73 square meters)    Stage 4 Severe CKD (GFR = 15-29 mL/min/1 73 square meters)    Stage 5 End Stage CKD (GFR <15 mL/min/1 73 square meters)  Note: GFR calculation is accurate only with a steady state creatinine    POCT Blood Gas (CG8+) [[id number]]  (Abnormal) Collected: 04/26/23 2054    Lab Status: Final result Specimen: Venous Updated: 04/26/23 2057     ph, Alphonso ISTAT 7 363     pCO2, Alphonso i-STAT 35 6 mm HG      pO2, Alphonso i-STAT 27 0 mm HG      BE, i-STAT -5 mmol/L      HCO3, Alphonso i-STAT 20 2 mmol/L      CO2, i-STAT 21 mmol/L      O2 Sat, i-STAT 48 %      SODIUM, I-STAT 136 mmol/l      Potassium, i-STAT 3 5 mmol/L      Calcium, Ionized i-STAT 1 20 mmol/L      Hct, i-STAT 33 %      Hgb, i-STAT 11 2 g/dl      Glucose, i-STAT 248 mg/dl      Specimen Type VENOUS          Imaging Results: I have personally reviewed pertinent reports  Chest Xray(s): negative for acute findings   FAST exam(s): negative for acute findings   CT Scan(s): negative for acute findings   Additional Xray(s): N/A     Other Studies: n/a    Code Status: No Order  Advance Directive and Living Will:      Power of :    POLST:    I have spent 45 minutes with Patient and family today in which greater than 50% of this time was spent in counseling/coordination of care regarding Diagnostic results, Patient and family education, Impressions, Counseling / Coordination of care, Documenting in the medical record, Reviewing / ordering tests, medicine, procedures  , Obtaining or reviewing history   and Communicating with other healthcare professionals

## 2023-04-27 NOTE — ASSESSMENT & PLAN NOTE
· Patient reportedly fell twice today, requiring help to get up  · She reports a history of generalized weakness in her legs, requiring use of a walker, however reports that today she felt more fatigued and weak than normal causing her to fall twice    · She is unsure if she had

## 2023-04-27 NOTE — PLAN OF CARE
Problem: PHYSICAL THERAPY ADULT  Goal: Performs mobility at highest level of function for planned discharge setting  See evaluation for individualized goals  Description: Treatment/Interventions: Functional transfer training, LE strengthening/ROM, Elevations, Therapeutic exercise, Endurance training, Cognitive reorientation, Patient/family training, Equipment eval/education, Bed mobility, Gait training          See flowsheet documentation for full assessment, interventions and recommendations  Note:    Problem List: Decreased strength, Decreased endurance, Impaired balance, Decreased mobility, Decreased safety awareness, Pain, Decreased cognition  Assessment: Pt presents after falling twice at home  Dx: generalized weakness, UTI, AIMEE on CKD, T11 compression fx, a-fib, DM, and psoriatric arthritis  order placed for PT eval and tx, w/ activity order of up and out of bed as tolerated  pt presents w/ comorbidities of DM, a-fib, HTN, hyperlipidemia, CVA, and psoriatic arthritis and personal factors of advanced age, mobilizing w/ assistive device, stair(s) to enter home, decreased cognition, positive fall history, inability to perform IADLs and inability to perform ADLs  pt presents w/ pain, weakness, decreased endurance, impaired cognition, impaired balance, gait deviations, decreased safety awareness and fall risk  these impairments are evident in findings from physical examination (weakness), mobility assessment (need for standby to min assist w/ all phases of mobility when usually mobilizing independently, tolerance to only 5 feet of ambulation and need for cueing for mobility technique), and Barthel Index: 50/100  pt needed input for task focus and mobility technique  pt is at risk for falls due to physical and safety awareness deficits   pt's clinical presentation is unstable/unpredictable (evident in poor blood sugar control, need for assist w/ all phases of mobility when usually mobilizing independently, tolerance to only 5 feet of ambulation and need for input for task focus and mobility technique)  pt needs inpatient PT tx to improve mobility deficits and progress mobility training as appropriate  discharge recommendation is for home w/ family support and outpatient PT to reduce fall risk and maximize level of functional independence  pt would benefit from Gerontology consult to address cognition and aging related issues  PT Discharge Recommendation: Home with outpatient rehabilitation    See flowsheet documentation for full assessment

## 2023-04-27 NOTE — ASSESSMENT & PLAN NOTE
· Patient reports chronic, generalized weakness; however, she endorses increased weakness compared to baseline  Additionally, she endorses subjective fever and chills earlier yesterday  SIRS criteria: Tachycardia, leukocytosis  Suspected source: UTI  · UA: Large leukocytes, 100(2+) protein, large occult blood  · Urine microscopic: RBC 10-20, innumerable WBCs  · Urine culture pending; no prior urine cultures for comparison  Lactic acid: 1 2  End organ damage: AIMEE > 2 0  IV Fluids: Received 1 L of LR in ED  IV antibiotics: IV Rocephin  Follow up on culture results  Monitor vital signs, laboratory studies  · Urinary retention protocol

## 2023-04-27 NOTE — SEPSIS NOTE
"  Sepsis Note   Harrison Burdick 79 y o  female MRN: [id number]  Unit/Bed#: S -01 Encounter: [id number]       Initial Sepsis Screening     Row Name 04/27/23 0115                Is the patient's history suggestive of a new or worsening infection? Yes (Proceed)  -CM        Suspected source of infection urinary tract infection  -CM        Indicate SIRS criteria Tachycardia > 90 bpm;Leukocytosis (WBC > 75467 IJL) OR Leukopenia (WBC <4000 IJL) OR Bandemia (WBC >10% bands)  -CM        Are two or more of the above signs & symptoms of infection both present and new to the patient? Yes (Proceed)  -CM        Assess for evidence of organ dysfunction: Are any of the below criteria present within 6 hours of suspected infection and SIRS criteria that are NOT considered to be chronic conditions? Creatinine > 2 0  -CM        Date of presentation of severe sepsis 04/27/23  -CM        Time of presentation of severe sepsis 0100  -CM        Sepsis Note: Click \"NEXT\" below (NOT \"close\") to generate sepsis note based on above information  --              User Key  (r) = Recorded By, (t) = Taken By, (c) = Cosigned By    [address] Name Provider Type    CM GRACE Henson Nurse Practitioner                    There is no height or weight on file to calculate BMI  Wt Readings from Last 1 Encounters:   04/26/23 89 2 kg (196 lb 10 4 oz)        Height is required to calculate ideal body weight    "

## 2023-04-27 NOTE — ED PROVIDER NOTES
Emergency Department Airway Evaluation and Management Form    History  Obtained from: Patient, EMS  Patient has no allergy information on record  Chief Complaint   Patient presents with    Trauma     Fall on Eliquis, unknown HS, temp 101     HPI    No past medical history on file  No past surgical history on file  No family history on file  I have reviewed and agree with the history as documented  Review of Systems    Physical Exam  There were no vitals taken for this visit  Physical Exam    ED Medications  Medications - No data to display    Intubation  Procedures    Notes  80yo Fall on anticoag  Ano external evidence of trauma  Febrile enroute per EMS  I was present in trauma bay for airway evaluation  Airway is patent and protected  No acute airway intervention required at this time  Further management of this patient by trauma attending and staff  I am available for airway management should condition change         Final Diagnosis  Final diagnoses:   None       ED Provider  Electronically Signed by     Viri Cassidy MD  04/26/23 2047

## 2023-04-27 NOTE — ASSESSMENT & PLAN NOTE
· Patient is known to Dr Bev Dennis of [facility] as an outpatient  · Patient maintained on weekly Methotrexate and Simponi Aria infusions

## 2023-04-27 NOTE — ASSESSMENT & PLAN NOTE
"· Patient presented as a trauma alert due to falling x2 at home while on Eliquis  Patient reports chronic, generalized weakness; however, she endorses increased weakness compared to baseline  Additionally, she endorses subjective fever and chills earlier yesterday  · Suspect 2/2 UTI  · Obtain COVID/Influenza/RSV  · CT head: \"No acute intracranial abnormality  \"  · CT cervical spine: \"No cervical spine fracture or traumatic malalignment  \"  · Fall precautions  · PT/OT evaluations  · CM consult    "

## 2023-04-27 NOTE — H&P
"[facility]  H&P  Name: Martine Mack 79 y o  female I MRN: [id number]  Unit/Bed#: S -01 I Date of Admission: 4/26/2023   Date of Service: 4/27/2023 I Hospital Day: 0      Assessment/Plan   * Severe sepsis due to UTI [facility])  Assessment & Plan  · Patient reports chronic, generalized weakness; however, she endorses increased weakness compared to baseline  Additionally, she endorses subjective fever and chills earlier yesterday  SIRS criteria: Tachycardia, leukocytosis  Suspected source: UTI  · UA: Large leukocytes, 100(2+) protein, large occult blood  · Urine microscopic: RBC 10-20, innumerable WBCs  · Urine culture pending; no prior urine cultures for comparison  Lactic acid: 1 2  End organ damage: AIMEE > 2 0  IV Fluids: Received 1 L of LR in ED  IV antibiotics: IV Rocephin  Follow up on culture results  Monitor vital signs, laboratory studies  · Urinary retention protocol  Acute kidney injury superimposed on CKD [facility])  Assessment & Plan  Lab Results   Component Value Date    EGFR 21 04/26/2023    CREATININE 2 10 (H) 04/26/2023    Creatinine upon admission: 2 10  o Baseline: 1 2-1 3   Secondary to poor oral intake   Treatment: IV hydration, hold Losartan   Avoid nephrotoxins   Avoid hypotension  · Monitor BMP  Generalized weakness  Assessment & Plan  · Patient presented as a trauma alert due to falling x2 at home while on Eliquis  Patient reports chronic, generalized weakness; however, she endorses increased weakness compared to baseline  Additionally, she endorses subjective fever and chills earlier yesterday  · Suspect 2/2 UTI  · Obtain COVID/Influenza/RSV  · CT head: \"No acute intracranial abnormality  \"  · CT cervical spine: \"No cervical spine fracture or traumatic malalignment  \"  · Fall precautions  · PT/OT evaluations  · CM consult      Compression fracture of T11 vertebra (HCC)  Assessment & Plan  · CT A/P: \"Moderate T11 compression fracture new " "since 2020  Indeterminate but may be subacute  \"  · PT/OT evaluations  Atrial fibrillation [facility])  Assessment & Plan  Patient is not on any rate or rhythm controlling agents  Antiplatelet/Anticoagulation: Eliquis    Chronic diastolic heart failure (HCC)  Assessment & Plan  Wt Readings from Last 3 Encounters:   04/26/23 89 2 kg (196 lb 10 4 oz)    Appears compensated at this time   Last echocardiogram from May 2022:  \"Left Ventricle: Left ventricle is normal in size  There is mild   concentric hypertrophy  Systolic function is normal with an ejection   fraction of 60-65%  Abnormal septal motion  \"   Monitor intake and output, daily weights   Low sodium diet and fluid restriction   Patient is not currently on a diuretic  Type 2 diabetes mellitus, without long-term current use of insulin ([facility])  Assessment & Plan  No results found for: HGBA1C    No results for input(s): POCGLU in the last 72 hours  Blood Sugar Average: Last 72 hrs:  · Obtain A1C   · PTA regimen of Metformin, will hold while inpatient  · Accu-checks and SSI  · Hypoglycemia protocol  Polyarticular psoriatic arthritis ([facility] [address] )  Assessment & Plan  · Patient is known to Dr Kristan Bowen of [facility] as an outpatient  · Patient maintained on weekly Methotrexate and Simponi Aria infusions  VTE Pharmacologic Prophylaxis: VTE Score: 6 High Risk (Score >/= 5) - Pharmacological DVT Prophylaxis Ordered: apixaban (Eliquis)  Sequential Compression Devices Ordered  Code Status: Level 1 - Full Code per patient  Discussion with family: Patient declined call to   Anticipated Length of Stay: Patient will be admitted on an inpatient basis with an anticipated length of stay of greater than 2 midnights secondary to AIMEE and IV abx for UTI      Total Time Spent on Date of Encounter in care of patient: 75 minutes This time was spent on one or more of the following: performing physical exam; counseling and coordination of care; " obtaining or reviewing history; documenting in the medical record; reviewing/ordering tests, medications or procedures; communicating with other healthcare professionals and discussing with patient's family/caregivers  Chief Complaint: Fall, generalized weakness, fever and chills    History of Present Illness:  Minnie Pizarro is a 79 y o  female with a PMH of atrial fibrillation on Eliquis, CKD, chronic diastolic heart failure, DM2 and polyarticular psoriatic arthritis who presents as a trauma alert due to falling x2 at home while on Eliquis  Patient reports chronic, generalized weakness; however, she endorses increased weakness compared to baseline  Additionally, she endorses subjective fever and chills earlier yesterday  Upon evaluation in the ED, patient was found to have a urinary tract infection as well as an acute kidney injury  Patient will be admitted for IV hydration, IV antibiotics, PT/OT evaluations and CM consult  Review of Systems:  Review of Systems   Constitutional: Positive for chills and fever  Respiratory: Negative for shortness of breath  Cardiovascular: Negative for chest pain  Gastrointestinal: Negative for abdominal pain, nausea and vomiting  Genitourinary: Positive for urgency  Negative for dysuria  Neurological: Positive for weakness (generalized )  All other systems reviewed and are negative  Past Medical and Surgical History:   No past medical history on file  No past surgical history on file  Meds/Allergies:  Prior to Admission medications    Medication Sig Start Date End Date Taking?  Authorizing Provider   amLODIPine (NORVASC) 10 mg tablet Take 10 mg by mouth daily   Yes Historical Provider, MD   apixaban (ELIQUIS) 5 mg Take 5 mg by mouth 2 (two) times a day   Yes Historical Provider, MD   atorvastatin (LIPITOR) 40 mg tablet Take 40 mg by mouth daily   Yes Historical Provider, MD   bimatoprost (LUMIGAN) 0 01 % ophthalmic drops 1 drop daily at bedtime Yes Historical Provider, MD   cholecalciferol (VITAMIN D3) 400 units tablet Take 2,000 Units by mouth daily   Yes Historical Provider, MD   folic acid (FOLVITE) 1 mg tablet Take by mouth daily   Yes Historical Provider, MD   Lecithin 1200 MG CAPS Take 1,200 mg by mouth   Yes Historical Provider, MD   losartan (COZAAR) 100 MG tablet Take 100 mg by mouth daily   Yes Historical Provider, MD   metFORMIN (GLUCOPHAGE) 500 mg tablet Take 500 mg by mouth daily with breakfast   Yes Historical Provider, MD   methotrexate 2 5 mg tablet Take 2 5 mg by mouth once a week   Yes Historical Provider, MD   pilocarpine (ISOPTO CARPINE) 4 % ophthalmic solution 1 drop 4 (four) times a day   Yes Historical Provider, MD   timolol (BETIMOL) 0 5 % ophthalmic solution 1 drop 2 (two) times a day   Yes Historical Provider, MD   rosuvastatin (CRESTOR) 40 MG tablet Take 40 mg by mouth 2 (two) times a day  Patient not taking: Reported on 4/27/2023    Historical Provider, MD     I have reviewed home medications with patient personally  Allergies: Allergies   Allergen Reactions    Lamisil [Terbinafine] Rash       Social History:  Marital Status: /Civil Union   Occupation: Unknown  Patient Pre-hospital Living Situation: Home, With spouse  Patient Pre-hospital Level of Mobility: walks  Patient Pre-hospital Diet Restrictions: Diabetic  Substance Use History:   Social History     Substance and Sexual Activity   Alcohol Use Not on file     Social History     Tobacco Use   Smoking Status Not on file   Smokeless Tobacco Not on file     Social History     Substance and Sexual Activity   Drug Use Not on file       Family History:  No family history on file      Physical Exam:     Vitals:   Blood Pressure: 130/69 (04/27/23 0225)  Pulse: 85 (04/27/23 0227)  Temperature: 99 3 °F (37 4 °C) (04/26/23 2046)  Temp Source: Oral (04/26/23 2046)  Respirations: 18 (04/27/23 0227)  Weight - Scale: 89 2 kg (196 lb 10 4 oz) (04/26/23 2046)  SpO2: 96 % (04/27/23 1115)    Physical Exam  Vitals and nursing note reviewed  Constitutional:       General: She is not in acute distress  Appearance: She is not ill-appearing or diaphoretic  HENT:      Head: Normocephalic  Nose: Nose normal       Mouth/Throat:      Pharynx: Oropharynx is clear  Eyes:      General: No scleral icterus  Conjunctiva/sclera: Conjunctivae normal    Cardiovascular:      Rate and Rhythm: Normal rate and regular rhythm  Pulses: Normal pulses  Posterior tibial pulses are 2+ on the right side and 2+ on the left side  Heart sounds: Normal heart sounds  No murmur heard  Pulmonary:      Effort: Pulmonary effort is normal  No respiratory distress  Breath sounds: Normal breath sounds  No wheezing, rhonchi or rales  Chest:      Chest wall: No tenderness  Abdominal:      General: Bowel sounds are normal  There is no distension  Palpations: Abdomen is soft  Tenderness: There is no abdominal tenderness  Musculoskeletal:         General: Normal range of motion  Cervical back: Normal range of motion  Skin:     General: Skin is warm and dry  Capillary Refill: Capillary refill takes 2 to 3 seconds  Neurological:      Mental Status: She is alert and oriented to person, place, and time     Psychiatric:         Speech: Speech normal           Additional Data:     Lab Results:  Results from last 7 days   Lab Units 04/27/23  0054   WBC Thousand/uL 18 95*   HEMOGLOBIN g/dL 10 8*   HEMATOCRIT % 33 0*   PLATELETS Thousands/uL 100*   NEUTROS PCT % 89*   LYMPHS PCT % 5*   MONOS PCT % 5   EOS PCT % 0     Results from last 7 days   Lab Units 04/26/23  2119   SODIUM mmol/L 132*   POTASSIUM mmol/L 3 4*   CHLORIDE mmol/L 102   CO2 mmol/L 20*   BUN mg/dL 27*   CREATININE mg/dL 2 10*   ANION GAP mmol/L 10   CALCIUM mg/dL 8 7   ALBUMIN g/dL 3 8   TOTAL BILIRUBIN mg/dL 0 57   ALK PHOS U/L 94   ALT U/L 23   AST U/L 34   GLUCOSE RANDOM mg/dL 245* Results from last 7 days   Lab Units 04/27/23  0054   LACTIC ACID mmol/L 1 2       Lines/Drains:  Invasive Devices     Peripheral Intravenous Line  Duration           Peripheral IV 04/26/23 Left Antecubital <1 day                    Imaging: Reviewed radiology reports from this admission including: chest xray, abdominal/pelvic CT, CT head and xray(s)  TRAUMA - CT head wo contrast   Final Result by ZAINA Corley MD (04/26 2150)      No acute intracranial abnormality  I personally discussed this study with Hernandez Loza on 4/26/2023 9:48 PM at 4/26/2023 9:48 PM          Workstation performed: [id number]         TRAUMA - CT spine cervical wo contrast   Final Result by ZAINA Corley MD (04/26 2150)      No cervical spine fracture or traumatic malalignment  I personally discussed this study with Hernandez Loza on 4/26/2023 9:48 PM at 4/26/2023 9:48 PM              Workstation performed: [id number]         CT abdomen pelvis wo contrast   Final Result by ZAINA Corley MD (04/26 2149)      Moderate T11 compression fracture new since 2020  Indeterminate but may be subacute   Air in the bladder  Correlate with urinalysis  I personally discussed this study with Hernandez Loza on 4/26/2023 9:48 PM at 4/26/2023 9:48 PM                             Workstation performed: [id number]         XR Trauma multiple (SLB/[facility] trauma bay ONLY)   Final Result by ZAINA Corley MD (04/26 2157)      No acute cardiopulmonary disease within limitations of supine imaging  Workstation performed: [id number]         XR chest 1 view   Final Result by ZAINA Corley MD (04/26 2157)      No acute cardiopulmonary disease within limitations of supine imaging  Workstation performed: [id number]             EKG and Other Studies Reviewed on Admission:   · EKG: Pending  ** Please Note: This note has been constructed using a voice recognition system   **

## 2023-04-27 NOTE — ASSESSMENT & PLAN NOTE
· Patient reportedly fell twice today, requiring help to get up  · She reports a history of generalized weakness in her legs, requiring use of a walker, however reports that she felt more fatigued and weak than normal causing her to fall twice

## 2023-04-27 NOTE — ASSESSMENT & PLAN NOTE
Lab Results   Component Value Date    EGFR 21 04/26/2023    CREATININE 2 10 (H) 04/26/2023    Creatinine upon admission: 2 10  o Baseline: 1 2-1 3   Secondary to poor oral intake   Treatment: IV hydration, hold Losartan   Avoid nephrotoxins   Avoid hypotension  · Monitor BMP

## 2023-04-27 NOTE — ASSESSMENT & PLAN NOTE
"Wt Readings from Last 3 Encounters:   04/26/23 89 2 kg (196 lb 10 4 oz)    Appears compensated at this time   Last echocardiogram from May 2022:  \"Left Ventricle: Left ventricle is normal in size  There is mild   concentric hypertrophy  Systolic function is normal with an ejection   fraction of 60-65%  Abnormal septal motion  \"   Monitor intake and output, daily weights   Low sodium diet and fluid restriction   Patient is not currently on a diuretic     "

## 2023-04-27 NOTE — PROCEDURES
POC FAST US    Date/Time: 4/26/2023 9:48 PM  Performed by: Jc Deluna DO  Authorized by:  Jc Deluna DO     Patient location:  Trauma  Procedure details:     Exam Type:  Diagnostic    Indications: blunt abdominal trauma      Assess for:  Intra-abdominal fluid and pericardial effusion    Technique: FAST      Views obtained:  Heart - Pericardial sac, RUQ - Saldivar's Pouch, LUQ - Splenorenal space and Suprapubic - Pouch of Aldo    Image quality: diagnostic      Image availability:  Images available in PACS  FAST Findings:     RUQ (Hepatorenal) free fluid: absent      LUQ (Splenorenal) free fluid: absent      Suprapubic free fluid: absent      Cardiac wall motion: identified      Pericardial effusion: absent    Interpretation:     Impressions: negative

## 2023-04-27 NOTE — SEPSIS NOTE
"  Sepsis Note   Angi Suarez 79 y o  female MRN: [id number]  Unit/Bed#: S -01 Encounter: [id number]       Initial Sepsis Screening     Row Name 04/27/23 0115                Is the patient's history suggestive of a new or worsening infection? Yes (Proceed)  -CM        Suspected source of infection urinary tract infection  -CM        Indicate SIRS criteria Tachycardia > 90 bpm;Leukocytosis (WBC > 34775 IJL) OR Leukopenia (WBC <4000 IJL) OR Bandemia (WBC >10% bands)  -CM        Are two or more of the above signs & symptoms of infection both present and new to the patient? Yes (Proceed)  -CM        Assess for evidence of organ dysfunction: Are any of the below criteria present within 6 hours of suspected infection and SIRS criteria that are NOT considered to be chronic conditions? Creatinine > 2 0  -CM        Date of presentation of severe sepsis 04/27/23  -CM        Time of presentation of severe sepsis 0100  -CM        Sepsis Note: Click \"NEXT\" below (NOT \"close\") to generate sepsis note based on above information  --              User Key  (r) = Recorded By, (t) = Taken By, (c) = Cosigned By    [id number]  Name Provider Type    MEHDI Gardner, [address]  Nurse Practitioner                Default Flowsheet Data (last 720 hours)     Sepsis Reassess     [address] Name 04/27/23 0240                   Repeat Volume Status and Tissue Perfusion Assessment Performed    Date of Reassessment: 04/27/23  -CM        Time of Reassessment: 4315  -CM        Sepsis Reassessment Note: Click \"NEXT\" below (NOT \"close\") to generate sepsis reassessment note  --        Repeat Volume Status and Tissue Perfusion Assessment Performed --              User Key  (r) = Recorded By, (t) = Taken By, (c) = Cosigned By    [id number]  Name Provider Type    GRACE Briones Nurse Practitioner                There is no height or weight on file to calculate BMI    Wt Readings from Last 1 Encounters:   04/26/23 89 2 kg (196 lb 10 4 oz)      " Height is required to calculate ideal body weight

## 2023-04-27 NOTE — ASSESSMENT & PLAN NOTE
· Mild  Family reported mild confusion at home, requiring help from family to answer some basic details of her falls

## 2023-04-27 NOTE — ASSESSMENT & PLAN NOTE
"· CT A/P: \"Moderate T11 compression fracture new since 2020  Indeterminate but may be subacute  \"  · PT/OT evaluations    "

## 2023-04-27 NOTE — ASSESSMENT & PLAN NOTE
No results found for: HGBA1C    No results for input(s): POCGLU in the last 72 hours  Blood Sugar Average: Last 72 hrs:  · Obtain A1C   · PTA regimen of Metformin, will hold while inpatient  · Accu-checks and SSI  · Hypoglycemia protocol

## 2023-04-27 NOTE — ASSESSMENT & PLAN NOTE
· Patient reports chronic, generalized weakness; however, she endorses increased weakness compared to baseline  Additionally, she endorses subjective fever and chills earlier yesterday  SIRS criteria: Tachycardia, leukocytosis  Suspected source: UTI  · UA: Large leukocytes, 100(2+) protein, large occult blood  · Urine microscopic: RBC 10-20, innumerable WBCs  · Urine culture pending; no prior urine cultures for comparison  Lactic acid: 1 2  End organ damage: AIMEE > 2 0  IV Fluids: Received 1 L of LR in ED  IV antibiotics: IV Rocephin  · Urine cultures growing E   Coli  · Follow final sensitivities

## 2023-04-27 NOTE — PLAN OF CARE
Problem: OCCUPATIONAL THERAPY ADULT  Goal: Performs self-care activities at highest level of function for planned discharge setting  See evaluation for individualized goals  Description: Treatment Interventions: ADL retraining, Functional transfer training, Endurance training, Cognitive reorientation, Patient/family training, Equipment evaluation/education, Compensatory technique education, Activityengagement          See flowsheet documentation for full assessment, interventions and recommendations  4/27/2023 1507 by JU Peacock  Outcome: Progressing  Note: Limitation: Decreased ADL status, Decreased Safe judgement during ADL, Decreased cognition, Decreased endurance, Decreased self-care trans, Decreased high-level ADLs  Prognosis: Good  Assessment: Pt is a 79 y o  female seen for OT evaluation s/p admission [facility] on 4/26/2023 due to fall at home  Diagnosed with Severe sepsis ([address] )  See medical history above for extensive list of comorbidities and significant PMHx affecting pt's functional performance at time of eval  Personal and env factors supporting pt at time of IE include social support and accessible home environment  Personal and env factors inhibiting engagement in occupations include advanced age, current habits and behavioral patterns, inaccessible bathroom environment, difficulty completing ADLs and difficulty completing IADLs  During evaluation pt performed as is outlined above in flowsheet  Performance deficits that affect the pts occupational performance include impaired balance, functional mobility, endurance, activity tolerance and overall strength, attention to task, direction following, communication and social skills, safety awareness, insight into deficits and problem solving, emotional regulation and use of coping skills   Based on pts functional performance and deficits the following occupations will be addressed in OT treatments in order to maximize pts independence and overall occupational performance: grooming, bathing/showering, toileting and toilet hygiene, dressing and functional mobility  OT Discharge Recommendation: Home with outpatient rehabilitation (OP cognitive therapy)       4/27/2023 1507 by JU Nash  Note: Limitation: Decreased ADL status, Decreased Safe judgement during ADL, Decreased cognition, Decreased endurance, Decreased self-care trans, Decreased high-level ADLs  Prognosis: Good  Assessment: Pt is a 79 y o  female seen for OT evaluation s/p admission [facility] on 4/26/2023 due to fall at home  Diagnosed with Severe sepsis ([facility] [address] )  See medical history above for extensive list of comorbidities and significant PMHx affecting pt's functional performance at time of eval  Personal and env factors supporting pt at time of IE include social support and accessible home environment  Personal and env factors inhibiting engagement in occupations include advanced age, current habits and behavioral patterns, inaccessible bathroom environment, difficulty completing ADLs and difficulty completing IADLs  During evaluation pt performed as is outlined above in flowsheet  Performance deficits that affect the pts occupational performance include impaired balance, functional mobility, endurance, activity tolerance and overall strength, attention to task, direction following, communication and social skills, safety awareness, insight into deficits and problem solving, emotional regulation and use of coping skills  Based on pts functional performance and deficits the following occupations will be addressed in OT treatments in order to maximize pts independence and overall occupational performance: grooming, bathing/showering, toileting and toilet hygiene, dressing and functional mobility       OT Discharge Recommendation: Home with outpatient rehabilitation (OP cognitive therapy)

## 2023-04-27 NOTE — PLAN OF CARE
Problem: Prexisting or High Potential for Compromised Skin Integrity  Goal: Skin integrity is maintained or improved  Description: INTERVENTIONS:  - Identify patients at risk for skin breakdown  - Assess and monitor skin integrity  - Assess and monitor nutrition and hydration status  - Monitor labs   - Assess for incontinence   - Turn and reposition patient  - Assist with mobility/ambulation  - Relieve pressure over bony prominences  - Avoid friction and shearing  - Provide appropriate hygiene as needed including keeping skin clean and dry  - Evaluate need for skin moisturizer/barrier cream  - Collaborate with interdisciplinary team   - Patient/family teaching  - Consider wound care consult   Outcome: Progressing     Problem: MOBILITY - ADULT  Goal: Maintain or return to baseline ADL function  Description: INTERVENTIONS:  -  Assess patient's ability to carry out ADLs; assess patient's baseline for ADL function and identify physical deficits which impact ability to perform ADLs (bathing, care of mouth/teeth, toileting, grooming, dressing, etc )  - Assess/evaluate cause of self-care deficits   - Assess range of motion  - Assess patient's mobility; develop plan if impaired  - Assess patient's need for assistive devices and provide as appropriate  - Encourage maximum independence but intervene and supervise when necessary  - Involve family in performance of ADLs  - Assess for home care needs following discharge   - Consider OT consult to assist with ADL evaluation and planning for discharge  - Provide patient education as appropriate  Outcome: Progressing  Goal: Maintains/Returns to pre admission functional level  Description: INTERVENTIONS:  - Perform BMAT or MOVE assessment daily    - Set and communicate daily mobility goal to care team and patient/family/caregiver  - Collaborate with rehabilitation services on mobility goals if consulted  - Perform Range of Motion    times a day    - Reposition patient every hours   - Dangle patient    times a day  - Stand patient    times a day  - Ambulate patient    times a day  - Out of bed to chair    times a day   - Out of bed for meals    times a day  - Out of bed for toileting  - Record patient progress and toleration of activity level   Outcome: Progressing     Problem: PAIN - ADULT  Goal: Verbalizes/displays adequate comfort level or baseline comfort level  Description: Interventions:  - Encourage patient to monitor pain and request assistance  - Assess pain using appropriate pain scale  - Administer analgesics based on type and severity of pain and evaluate response  - Implement non-pharmacological measures as appropriate and evaluate response  - Consider cultural and social influences on pain and pain management  - Notify physician/advanced practitioner if interventions unsuccessful or patient reports new pain  Outcome: Progressing     Problem: INFECTION - ADULT  Goal: Absence or prevention of progression during hospitalization  Description: INTERVENTIONS:  - Assess and monitor for signs and symptoms of infection  - Monitor lab/diagnostic results  - Monitor all insertion sites, i e  indwelling lines, tubes, and drains  - Monitor endotracheal if appropriate and nasal secretions for changes in amount and color  - [place] appropriate cooling/warming therapies per order  - Administer medications as ordered  - Instruct and encourage patient and family to use good hand hygiene technique  - Identify and instruct in appropriate isolation precautions for identified infection/condition  Outcome: Progressing  Goal: Absence of fever/infection during neutropenic period  Description: INTERVENTIONS:  - Monitor WBC    Outcome: Progressing     Problem: SAFETY ADULT  Goal: Maintain or return to baseline ADL function  Description: INTERVENTIONS:  -  Assess patient's ability to carry out ADLs; assess patient's baseline for ADL function and identify physical deficits which impact ability to perform ADLs (bathing, care of mouth/teeth, toileting, grooming, dressing, etc )  - Assess/evaluate cause of self-care deficits   - Assess range of motion  - Assess patient's mobility; develop plan if impaired  - Assess patient's need for assistive devices and provide as appropriate  - Encourage maximum independence but intervene and supervise when necessary  - Involve family in performance of ADLs  - Assess for home care needs following discharge   - Consider OT consult to assist with ADL evaluation and planning for discharge  - Provide patient education as appropriate  Outcome: Progressing  Goal: Maintains/Returns to pre admission functional level  Description: INTERVENTIONS:  - Perform BMAT or MOVE assessment daily    - Set and communicate daily mobility goal to care team and patient/family/caregiver  - Collaborate with rehabilitation services on mobility goals if consulted  - Perform Range of Motion    times a day  - Reposition patient every    hours    - Dangle patient    times a day  - Stand patient    times a day  - Ambulate patient    times a day  - Out of bed to chair    times a day   - Out of bed for meals      times a day  - Out of bed for toileting  - Record patient progress and toleration of activity level   Outcome: Progressing  Goal: Patient will remain free of falls  Description: INTERVENTIONS:  - Educate patient/family on patient safety including physical limitations  - Instruct patient to call for assistance with activity   - Consult OT/PT to assist with strengthening/mobility   - Keep Call bell within reach  - Keep bed low and locked with side rails adjusted as appropriate  - Keep care items and personal belongings within reach  - Initiate and maintain comfort rounds  - Make Fall Risk Sign visible to staff  - Offer Toileting every    Hours, in advance of need  - Initiate/Maintain         alarm  - Obtain necessary fall risk management equipment:     - Apply yellow socks and bracelet for high fall risk patients  - Consider moving patient to room near nurses station  Outcome: Progressing     Problem: DISCHARGE PLANNING  Goal: Discharge to home or other facility with appropriate resources  Description: INTERVENTIONS:  - Identify barriers to discharge w/patient and caregiver  - Arrange for needed discharge resources and transportation as appropriate  - Identify discharge learning needs (meds, wound care, etc )  - Arrange for interpretive services to assist at discharge as needed  - Refer to Case Management Department for coordinating discharge planning if the patient needs post-hospital services based on physician/advanced practitioner order or complex needs related to functional status, cognitive ability, or social support system  Outcome: Progressing     Problem: Knowledge Deficit  Goal: Patient/family/caregiver demonstrates understanding of disease process, treatment plan, medications, and discharge instructions  Description: Complete learning assessment and assess knowledge base  Interventions:  - Provide teaching at level of understanding  - Provide teaching via preferred learning methods  Outcome: Progressing     Problem: Nutrition/Hydration-ADULT  Goal: Nutrient/Hydration intake appropriate for improving, restoring or maintaining nutritional needs  Description: Monitor and assess patient's nutrition/hydration status for malnutrition  Collaborate with interdisciplinary team and initiate plan and interventions as ordered  Monitor patient's weight and dietary intake as ordered or per policy  Utilize nutrition screening tool and intervene as necessary  Determine patient's food preferences and provide high-protein, high-caloric foods as appropriate       INTERVENTIONS:  - Monitor oral intake, urinary output, labs, and treatment plans  - Assess nutrition and hydration status and recommend course of action  - Evaluate amount of meals eaten  - Assist patient with eating if necessary   - Allow adequate time for meals  - Recommend/ encourage appropriate diets, oral nutritional supplements, and vitamin/mineral supplements  - Order, calculate, and assess calorie counts as needed  - Recommend, monitor, and adjust tube feedings and TPN/PPN based on assessed needs  - Assess need for intravenous fluids  - Provide specific nutrition/hydration education as appropriate  - Include patient/family/caregiver in decisions related to nutrition  Outcome: Progressing

## 2023-04-27 NOTE — OCCUPATIONAL THERAPY NOTE
"    Occupational Therapy Evaluation + MoCA Evaluation     Patient Name: Edilma Kwan  Today's Date: 4/27/2023  Problem List  Principal Problem:    Severe sepsis due to UTI [facility])  Active Problems:    Generalized weakness    Acute kidney injury superimposed on CKD (HCC)    Type 2 diabetes mellitus, without long-term current use of insulin (HCC)    Polyarticular psoriatic arthritis (HCC)    Chronic diastolic heart failure (HCC)    Atrial fibrillation (HCC)    Compression fracture of T11 vertebra (HCC)    Past Medical History  No past medical history on file  Past Surgical History  No past surgical history on file  04/27/23 1335   OT Last Visit   OT Visit Date 04/27/23   Note Type   Note type Evaluation   Pain Assessment   Pain Assessment Tool 0-10   Pain Score No Pain   Restrictions/Precautions   Weight Bearing Precautions Per Order No   Braces or Orthoses   (none: as per Dr Santiago Shoulder pt's T11 fx appears chronic and pt does not need brace)   Other Precautions Cognitive; Chair Alarm; Bed Alarm; Fall Risk   Home Living   Type of [address] [place] [address] One level  (1 [address]ILMAN)   Bathroom Shower/Tub Tub/shower unit   Bathroom Toilet Standard   Bathroom Equipment Grab bars in shower; Shower chair   Bathroom Accessibility Not accessible  (with rollator, pt reports that rollator does not fit into the bathroom)   Home Equipment Walker   Additional Comments use of rollator at baseline   Prior Function   Level of [place] Needs assistance with ADLs  ((I) with toileting and dressing, daughter (A) with bathing)   Lives With Spouse   Receives Help From Family  (daughter lives locally)   IADLs   ( completes cooking, cleaning, laundry and drives pt reports \"I keep my license just in case I need to drive someday\")   Falls in the last 6 months 1 to 4  (2)   Vocational Retired   Lifestyle   Autonomy PTA pt living with  in [place], pt requiring (A) with all ADLs and IADLs, (+)falls, (-)drives, use of rollator at " "baseline   Reciprocal Relationships supportive  and daughter   Service to Others retired   [address] enjoys reading   General   Additional Pertinent History Pt found to have chronic T11 fx, no need for brace   Family/Caregiver Present No   Subjective   Subjective \"WHAT IS WITH ALL THESE THINGS, UGH THE BEEPING AND THIS LIGHT\"   ADL   Eating Assistance 7  Independent   Grooming Assistance 7  Independent   UB Bathing Assistance 5  Supervision/Setup   LB Bathing Assistance 5  Supervision/Setup   UB Dressing Assistance 5  Supervision/Setup   UB Dressing Deficit Increased time to complete; Thread RUE; Thread LUE   LB Dressing Assistance 5  Supervision/Setup   LB Dressing Deficit Increased time to complete; Don/doff R sock; Don/doff L sock; Thread RLE into underwear; Thread LLE into underwear;Pull up over hips   Toileting Assistance  5  Supervision/Setup   Toileting Deficit Increased time to complete;Clothing management down;Perineal hygiene;Clothing management up   Additional Comments Did not observe eating, grooming, UB bathing and LB bathing at time of evaluation, with use of clinical reasoning, pt's performance throughout evaluation indicates that pt may be able to perform these tasks at the levels listed above   Bed Mobility   Supine to Sit 4  Minimal assistance   Additional items Assist x 1; Increased time required;Verbal cues;LE management   Transfers   Sit to Stand 5  Supervision   Additional items Increased time required;Verbal cues   Stand to Sit 5  Supervision   Additional items Increased time required;Verbal cues   Additional Comments use of RW   Functional Mobility   Functional Mobility 5  Supervision   Additional Comments functional household distance, us of RW, pt with complaints about the \"smoothness\" of the RW and that its not like her rollator   Additional items Rolling walker   Balance   Static Sitting Good   Dynamic Sitting Fair +   Static Standing Fair +   Dynamic Standing Fair " Ambulatory Fair -   Activity Tolerance   Activity Tolerance Patient limited by fatigue   Medical Staff Made Aware RN Amaury Peguero, TT Dr Ciara De Anda Assessment   LUE Assessment WFL   Hand Function   Gross Motor Coordination Functional   Fine Motor Coordination Functional   Cognition   Overall Cognitive Status Impaired   Arousal/Participation Alert; Cooperative   Attention Attends with cues to redirect   Orientation Level Oriented to person;Oriented to place;Oriented to time;Oriented to situation  (grossly to time)   Memory Decreased short term memory;Decreased recall of recent events   Following Commands Follows one step commands with increased time or repetition   Comments Pt is highly distractable, requiring VC for attention to task  Pt with negative self talk and with complaints about the IV and bed and robe making noises/being uncomfortable   Cognition Assessment Tools (S)  MOCA   Score (S)  21  (see below)   Assessment   Limitation Decreased ADL status; Decreased Safe judgement during ADL;Decreased cognition;Decreased endurance;Decreased self-care trans;Decreased high-level ADLs   Prognosis Good   Assessment Pt is a 79 y o  female seen for OT evaluation s/p [facility] on 4/26/2023 due to fall at home  Diagnosed with Severe sepsis ([address] )  See medical history above for extensive list of comorbidities and significant PMHx affecting pt's functional performance at time of eval  Personal and env factors supporting pt at time of IE include social support and accessible home environment  Personal and env factors inhibiting engagement in occupations include advanced age, current habits and behavioral patterns, inaccessible bathroom environment, difficulty completing ADLs and difficulty completing IADLs  During evaluation pt performed as is outlined above in flowsheet   Performance deficits that affect the pts occupational performance include impaired balance, functional mobility, endurance, activity tolerance and overall strength, attention to task, direction following, communication and social skills, safety awareness, insight into deficits and problem solving, emotional regulation and use of coping skills  Based on pts functional performance and deficits the following occupations will be addressed in OT treatments in order to maximize pts independence and overall occupational performance: grooming, bathing/showering, toileting and toilet hygiene, dressing and functional mobility  Goals   Patient Goals to go home   LTG Time Frame 10-14   Long Term Goal see goals listed below   Plan   Treatment Interventions ADL retraining;Functional transfer training; Endurance training;Cognitive reorientation;Patient/family training;Equipment evaluation/education; Compensatory technique education; Activityengagement   Goal Expiration Date 05/07/23   OT Treatment Day 0   OT Frequency 2-3x/wk   Recommendation   OT Discharge Recommendation Home with outpatient rehabilitation  (OP cognitive therapy)   AM-PAC Daily Activity Inpatient   Lower Body Dressing 3   Bathing 3   Toileting 3   Upper Body Dressing 3   Grooming 4   Eating 4   Daily Activity Raw Score 20   Daily Activity Standardized Score (Calc for Raw Score >=11) 42 03   AM-PAC Applied Cognition Inpatient   Following a Speech/Presentation 3   Understanding Ordinary Conversation 4   Taking Medications 2   Remembering Where Things Are Placed or Put Away 2   Remembering List of 4-5 Errands 1   Taking Care of Complicated Tasks 1   Applied Cognition Raw Score 13   Applied Cognition Standardized Score 30 46   MOCA   Version 8 1   Visuopatial/Executive 2  (incorrect number/letter sequence, missing line in cube drawing, clock with correct contour and numbers, pt drawing one hand from center of clock to 10 and then turning hand 90* from 10 and pointing at 11)   Naming 3   Memory 0  (1st trial 2/5 2nd trial 5/5)   Attention: Digits 2   Attention: "Letters 1   Attention: Serial 2  (Pt states \"thats it I cut myself off\" after 93 and 86, refusing to continue task)   Language: Repeat 2   Language: Fluency 0  (7 words)   Abstraction 1  (states differences between watch and ruler not similarities)   Delayed Recall 4  (MIS score 14)   Orientation 4  (Not oriented to date: states 24th, and city states \"[place]\")   Does patient have less than or equal to 12 years of education? 0   MOCA Total Score 21   MOCA Comments (S)  Score indicates pt with mild cognitive impairment   End of Consult   Patient Position at End of Consult Bedside chair;Bed/Chair alarm activated; All needs within reach       GOALS:      -Patient will perform grooming tasks standing at sink with overall Mod I in order to increase overall independence    -Patient will be Mod I with UB ADLs using AE and AD as needed in order to increase (I) with ADLs    -Patient will be Mod I with LB ADLs with use of AE and AD as needed in order to increase (I) with ADLs    -Patient will complete toileting w/ Mod I w/ G hygiene/thoroughness in order to reduce caregiver burden    -Patient will demonstrate Mod I with bed mobility for ability to manage own comfort and initiate OOB tasks      -Patient will perform functional transfers with Mod I to/from all surfaces using DME as needed in order to increase (I) with functional tasks    -Patient will be Mod I with functional mobility to/from bathroom for increased independence with toileting tasks    -Patient will tolerate therapeutic activities for greater than 30 min, in order to increase tolerance for functional activities      -Patient will engage in ongoing cognitive assessment in order to assist with safe discharge planning/recommendations  The patient's raw score on the -PAC Daily Activity Inpatient Short Form is 20  A raw score of greater than or equal to 19 suggests the patient may benefit from discharge to home   Please refer to the recommendation of the " Occupational Therapist for safe discharge planning  This session, pt required and most appropriately benefited from skilled OT/PT co-eval due to significant regression from functional baseline and decreased activity tolerance  OT and PT goals were addressed separately as seen in documentation       Tatum tSevens MS, OTR/L

## 2023-04-28 ENCOUNTER — APPOINTMENT (INPATIENT)
Dept: ULTRASOUND IMAGING | Facility: HOSPITAL | Age: 83
End: 2023-04-28

## 2023-04-28 LAB
ANION GAP SERPL CALCULATED.3IONS-SCNC: 7 MMOL/L (ref 4–13)
BASOPHILS # BLD AUTO: 0.02 THOUSANDS/ÂΜL (ref 0–0.1)
BASOPHILS NFR BLD AUTO: 0 % (ref 0–1)
BUN SERPL-MCNC: 32 MG/DL (ref 5–25)
CALCIUM SERPL-MCNC: 8.9 MG/DL (ref 8.4–10.2)
CHLORIDE SERPL-SCNC: 109 MMOL/L (ref 96–108)
CO2 SERPL-SCNC: 23 MMOL/L (ref 21–32)
CREAT SERPL-MCNC: 2.52 MG/DL (ref 0.6–1.3)
EOSINOPHIL # BLD AUTO: 0.21 THOUSAND/ÂΜL (ref 0–0.61)
EOSINOPHIL NFR BLD AUTO: 2 % (ref 0–6)
ERYTHROCYTE [DISTWIDTH] IN BLOOD BY AUTOMATED COUNT: 13.3 % (ref 11.6–15.1)
GFR SERPL CREATININE-BSD FRML MDRD: 17 ML/MIN/1.73SQ M
GLUCOSE SERPL-MCNC: 126 MG/DL (ref 65–140)
GLUCOSE SERPL-MCNC: 144 MG/DL (ref 65–140)
GLUCOSE SERPL-MCNC: 155 MG/DL (ref 65–140)
GLUCOSE SERPL-MCNC: 186 MG/DL (ref 65–140)
GLUCOSE SERPL-MCNC: 194 MG/DL (ref 65–140)
HCT VFR BLD AUTO: 32.2 % (ref 34.8–46.1)
HGB BLD-MCNC: 10.4 G/DL (ref 11.5–15.4)
IMM GRANULOCYTES # BLD AUTO: 0.02 THOUSAND/UL (ref 0–0.2)
IMM GRANULOCYTES NFR BLD AUTO: 0 % (ref 0–2)
LYMPHOCYTES # BLD AUTO: 3.14 THOUSANDS/ÂΜL (ref 0.6–4.47)
LYMPHOCYTES NFR BLD AUTO: 33 % (ref 14–44)
MCH RBC QN AUTO: 31.9 PG (ref 26.8–34.3)
MCHC RBC AUTO-ENTMCNC: 32.3 G/DL (ref 31.4–37.4)
MCV RBC AUTO: 99 FL (ref 82–98)
MONOCYTES # BLD AUTO: 0.99 THOUSAND/ÂΜL (ref 0.17–1.22)
MONOCYTES NFR BLD AUTO: 11 % (ref 4–12)
NEUTROPHILS # BLD AUTO: 5.09 THOUSANDS/ÂΜL (ref 1.85–7.62)
NEUTS SEG NFR BLD AUTO: 54 % (ref 43–75)
NRBC BLD AUTO-RTO: 0 /100 WBCS
PLATELET # BLD AUTO: 102 THOUSANDS/UL (ref 149–390)
PMV BLD AUTO: 10.8 FL (ref 8.9–12.7)
POTASSIUM SERPL-SCNC: 3.3 MMOL/L (ref 3.5–5.3)
RBC # BLD AUTO: 3.26 MILLION/UL (ref 3.81–5.12)
SODIUM SERPL-SCNC: 139 MMOL/L (ref 135–147)
WBC # BLD AUTO: 9.47 THOUSAND/UL (ref 4.31–10.16)

## 2023-04-28 RX ORDER — MICONAZOLE NITRATE 20 MG/G
CREAM TOPICAL 2 TIMES DAILY
Status: DISCONTINUED | OUTPATIENT
Start: 2023-04-28 | End: 2023-04-29 | Stop reason: HOSPADM

## 2023-04-28 RX ORDER — SODIUM CHLORIDE, SODIUM LACTATE, POTASSIUM CHLORIDE, CALCIUM CHLORIDE 600; 310; 30; 20 MG/100ML; MG/100ML; MG/100ML; MG/100ML
75 INJECTION, SOLUTION INTRAVENOUS CONTINUOUS
Status: DISPENSED | OUTPATIENT
Start: 2023-04-28 | End: 2023-04-29

## 2023-04-28 RX ADMIN — ATORVASTATIN CALCIUM 40 MG: 40 TABLET, FILM COATED ORAL at 08:01

## 2023-04-28 RX ADMIN — CEFTRIAXONE SODIUM 1000 MG: 10 INJECTION, POWDER, FOR SOLUTION INTRAVENOUS at 23:37

## 2023-04-28 RX ADMIN — APIXABAN 2.5 MG: 2.5 TABLET, FILM COATED ORAL at 08:01

## 2023-04-28 RX ADMIN — Medication 2000 UNITS: at 08:01

## 2023-04-28 RX ADMIN — INSULIN LISPRO 2 UNITS: 100 INJECTION, SOLUTION INTRAVENOUS; SUBCUTANEOUS at 08:00

## 2023-04-28 RX ADMIN — TIMOLOL MALEATE 1 DROP: 5 SOLUTION/ DROPS OPHTHALMIC at 17:23

## 2023-04-28 RX ADMIN — BIMATOPROST 1 DROP: 0.1 SOLUTION/ DROPS OPHTHALMIC at 21:58

## 2023-04-28 RX ADMIN — AMLODIPINE BESYLATE 10 MG: 5 TABLET ORAL at 08:01

## 2023-04-28 RX ADMIN — APIXABAN 2.5 MG: 2.5 TABLET, FILM COATED ORAL at 17:23

## 2023-04-28 RX ADMIN — SODIUM CHLORIDE, SODIUM LACTATE, POTASSIUM CHLORIDE, AND CALCIUM CHLORIDE 75 ML/HR: .6; .31; .03; .02 INJECTION, SOLUTION INTRAVENOUS at 17:22

## 2023-04-28 RX ADMIN — MICONAZOLE NITRATE 1 APPLICATION.: 20 CREAM TOPICAL at 17:23

## 2023-04-28 RX ADMIN — FOLIC ACID 1 MG: 1 TABLET ORAL at 08:01

## 2023-04-28 RX ADMIN — INSULIN LISPRO 1 UNITS: 100 INJECTION, SOLUTION INTRAVENOUS; SUBCUTANEOUS at 17:23

## 2023-04-28 RX ADMIN — TIMOLOL MALEATE 1 DROP: 5 SOLUTION/ DROPS OPHTHALMIC at 08:02

## 2023-04-28 RX ADMIN — MICONAZOLE NITRATE: 20 CREAM TOPICAL at 13:03

## 2023-04-28 RX ADMIN — INSULIN LISPRO 1 UNITS: 100 INJECTION, SOLUTION INTRAVENOUS; SUBCUTANEOUS at 21:58

## 2023-04-28 RX ADMIN — SODIUM CHLORIDE, SODIUM LACTATE, POTASSIUM CHLORIDE, AND CALCIUM CHLORIDE 75 ML/HR: .6; .31; .03; .02 INJECTION, SOLUTION INTRAVENOUS at 12:40

## 2023-04-28 NOTE — ASSESSMENT & PLAN NOTE
"· Patient presented as a trauma alert due to falling x2 at home while on Eliquis  Patient reports chronic, generalized weakness; however, she endorses increased weakness compared to baseline  Additionally, she endorses subjective fever and chills earlier yesterday  · Suspect 2/2 UTI  · Obtain COVID/Influenza/RSV  · CT head: \"No acute intracranial abnormality  \"  · CT cervical spine: \"No cervical spine fracture or traumatic malalignment  \"  · Fall precautions    · Outpatient PT recommended  "

## 2023-04-28 NOTE — ASSESSMENT & PLAN NOTE
· Patient is known to Dr Sandhya Navarro of [facility] as an outpatient  · Patient maintained on weekly Methotrexate and Simponi Aria infusions

## 2023-04-28 NOTE — PLAN OF CARE
Problem: Prexisting or High Potential for Compromised Skin Integrity  Goal: Skin integrity is maintained or improved  Description: INTERVENTIONS:  - Identify patients at risk for skin breakdown  - Assess and monitor skin integrity  - Assess and monitor nutrition and hydration status  - Monitor labs   - Assess for incontinence   - Turn and reposition patient  - Assist with mobility/ambulation  - Relieve pressure over bony prominences  - Avoid friction and shearing  - Provide appropriate hygiene as needed including keeping skin clean and dry  - Evaluate need for skin moisturizer/barrier cream  - Collaborate with interdisciplinary team   - Patient/family teaching  - Consider wound care consult   Outcome: Progressing     Problem: MOBILITY - ADULT  Goal: Maintain or return to baseline ADL function  Description: INTERVENTIONS:  -  Assess patient's ability to carry out ADLs; assess patient's baseline for ADL function and identify physical deficits which impact ability to perform ADLs (bathing, care of mouth/teeth, toileting, grooming, dressing, etc )  - Assess/evaluate cause of self-care deficits   - Assess range of motion  - Assess patient's mobility; develop plan if impaired  - Assess patient's need for assistive devices and provide as appropriate  - Encourage maximum independence but intervene and supervise when necessary  - Involve family in performance of ADLs  - Assess for home care needs following discharge   - Consider OT consult to assist with ADL evaluation and planning for discharge  - Provide patient education as appropriate  Outcome: Progressing  Goal: Maintains/Returns to pre admission functional level  Description: INTERVENTIONS:  - Perform BMAT or MOVE assessment daily    - Set and communicate daily mobility goal to care team and patient/family/caregiver     - Collaborate with rehabilitation services on mobility goals if consulted  - Out of bed for toileting  - Record patient progress and toleration of activity level   Outcome: Progressing     Problem: PAIN - ADULT  Goal: Verbalizes/displays adequate comfort level or baseline comfort level  Description: Interventions:  - Encourage patient to monitor pain and request assistance  - Assess pain using appropriate pain scale  - Administer analgesics based on type and severity of pain and evaluate response  - Implement non-pharmacological measures as appropriate and evaluate response  - Consider cultural and social influences on pain and pain management  - Notify physician/advanced practitioner if interventions unsuccessful or patient reports new pain  Outcome: Progressing     Problem: INFECTION - ADULT  Goal: Absence or prevention of progression during hospitalization  Description: INTERVENTIONS:  - Assess and monitor for signs and symptoms of infection  - Monitor lab/diagnostic results  - Monitor all insertion sites, i e  indwelling lines, tubes, and drains  - Monitor endotracheal if appropriate and nasal secretions for changes in amount and color  - [employer] appropriate cooling/warming therapies per order  - Administer medications as ordered  - Instruct and encourage patient and family to use good hand hygiene technique  - Identify and instruct in appropriate isolation precautions for identified infection/condition  Outcome: Progressing  Goal: Absence of fever/infection during neutropenic period  Description: INTERVENTIONS:  - Monitor WBC    Outcome: Progressing     Problem: SAFETY ADULT  Goal: Maintain or return to baseline ADL function  Description: INTERVENTIONS:  -  Assess patient's ability to carry out ADLs; assess patient's baseline for ADL function and identify physical deficits which impact ability to perform ADLs (bathing, care of mouth/teeth, toileting, grooming, dressing, etc )  - Assess/evaluate cause of self-care deficits   - Assess range of motion  - Assess patient's mobility; develop plan if impaired  - Assess patient's need for assistive devices and provide as appropriate  - Encourage maximum independence but intervene and supervise when necessary  - Involve family in performance of ADLs  - Assess for home care needs following discharge   - Consider OT consult to assist with ADL evaluation and planning for discharge  - Provide patient education as appropriate  Outcome: Progressing  Goal: Maintains/Returns to pre admission functional level  Description: INTERVENTIONS:  - Perform BMAT or MOVE assessment daily    - Set and communicate daily mobility goal to care team and patient/family/caregiver     - Collaborate with rehabilitation services on mobility goals if consulted  - Perform Range of Motion   - Reposition patient   - Dangle patient   - Stand patient   - Ambulate patient   - Out of bed to chair   - Out of bed for meals   - Out of bed for toileting  - Record patient progress and toleration of activity level   Outcome: Progressing  Goal: Patient will remain free of falls  Description: INTERVENTIONS:  - Educate patient/family on patient safety including physical limitations  - Instruct patient to call for assistance with activity   - Consult OT/PT to assist with strengthening/mobility   - Keep Call bell within reach  - Keep bed low and locked with side rails adjusted as appropriate  - Keep care items and personal belongings within reach  - Initiate and maintain comfort rounds  - Make Fall Risk Sign visible to staff  - Offer Toileting   - Initiate/Maintain alarm  - Obtain necessary fall risk management equipment  - Apply yellow socks and bracelet for high fall risk patients  - Consider moving patient to room near nurses station  Outcome: Progressing     Problem: DISCHARGE PLANNING  Goal: Discharge to home or other facility with appropriate resources  Description: INTERVENTIONS:  - Identify barriers to discharge w/patient and caregiver  - Arrange for needed discharge resources and transportation as appropriate  - Identify discharge learning needs (meds, wound care, etc )  - Arrange for interpretive services to assist at discharge as needed  - Refer to Case Management Department for coordinating discharge planning if the patient needs post-hospital services based on physician/advanced practitioner order or complex needs related to functional status, cognitive ability, or social support system  Outcome: Progressing     Problem: Knowledge Deficit  Goal: Patient/family/caregiver demonstrates understanding of disease process, treatment plan, medications, and discharge instructions  Description: Complete learning assessment and assess knowledge base  Interventions:  - Provide teaching at level of understanding  - Provide teaching via preferred learning methods  Outcome: Progressing     Problem: Nutrition/Hydration-ADULT  Goal: Nutrient/Hydration intake appropriate for improving, restoring or maintaining nutritional needs  Description: Monitor and assess patient's nutrition/hydration status for malnutrition  Collaborate with interdisciplinary team and initiate plan and interventions as ordered  Monitor patient's weight and dietary intake as ordered or per policy  Utilize nutrition screening tool and intervene as necessary  Determine patient's food preferences and provide high-protein, high-caloric foods as appropriate       INTERVENTIONS:  - Monitor oral intake, urinary output, labs, and treatment plans  - Assess nutrition and hydration status and recommend course of action  - Evaluate amount of meals eaten  - Assist patient with eating if necessary   - Allow adequate time for meals  - Recommend/ encourage appropriate diets, oral nutritional supplements, and vitamin/mineral supplements  - Order, calculate, and assess calorie counts as needed  - Recommend, monitor, and adjust tube feedings and TPN/PPN based on assessed needs  - Assess need for intravenous fluids  - Provide specific nutrition/hydration education as appropriate  - Include patient/family/caregiver in decisions related to nutrition  Outcome: Progressing

## 2023-04-28 NOTE — ASSESSMENT & PLAN NOTE
"Wt Readings from Last 3 Encounters:   04/28/23 72 kg (158 lb 11 7 oz)    Appears compensated at this time   Last echocardiogram from May 2022:  \"Left Ventricle: Left ventricle is normal in size  There is mild   concentric hypertrophy  Systolic function is normal with an ejection   fraction of 60-65%  Abnormal septal motion  \"   Monitor intake and output, daily weights   Low sodium diet and fluid restriction   Patient is not currently on a diuretic      Continue to monitor volume status with IV hydration  "

## 2023-04-28 NOTE — ASSESSMENT & PLAN NOTE
Lab Results   Component Value Date    EGFR 17 04/28/2023    EGFR 19 04/27/2023    EGFR 21 04/26/2023    CREATININE 2 52 (H) 04/28/2023    CREATININE 2 22 (H) 04/27/2023    CREATININE 2 10 (H) 04/26/2023    Creatinine upon admission: 2 10  o Baseline: 1 2-1 3   Secondary to poor oral intake   Treatment: IV hydration, hold Losartan    · Creatinine remains elevated at 2 5  · Check renal ultrasound  · Check bladder scan  · Check postvoid residual

## 2023-04-28 NOTE — CASE MANAGEMENT
Case Management Discharge Planning Note    Patient name Dipesh Mitchell  Location S /S -01 MRN [id number]  : 1940 Date 2023       Current Admission Date: 2023  Current Admission Diagnosis:Severe sepsis due to UTI [facility])   Patient Active Problem List    Diagnosis Date Noted    Severe sepsis due to UTI ([facility] ) 2023    Acute kidney injury superimposed on CKD ([place] [address] ) 2023    Type 2 diabetes mellitus, without long-term current use of insulin ([place] [address] ) 2023    Polyarticular psoriatic arthritis ([place] [address] ) 2023    Chronic diastolic heart failure ([place] [address] ) 2023    Atrial fibrillation ([place] [address] ) 2023    Compression fracture of T11 vertebra (HCC) 2023    Generalized weakness 2023      LOS (days): 1  Geometric Mean LOS (GMLOS) (days): 3 50  Days to GMLOS:2 1     OBJECTIVE:  Risk of Unplanned Readmission Score: 13 77         Current admission status: Inpatient   [facility]  No address on file      Primary Care Provider: Princess Garcia MD    Primary Insurance: MEDICARE  Secondary Insurance: [facility]    DISCHARGE DETAILS:     Per PT OT, pt is appropriate for OP therapies upon DC  OP list has been placed in label binder to be given to pt upon DC

## 2023-04-28 NOTE — PROGRESS NOTES
"[facility]  Progress Note  Name: Juju Choudhary  MRN: [id number]  Unit/Bed#: S -01 I Date of Admission: 4/26/2023   Date of Service: 4/28/2023 I Hospital Day: 1    Assessment/Plan   * Severe sepsis due to UTI [facility])  Assessment & Plan  · Patient reports chronic, generalized weakness; however, she endorses increased weakness compared to baseline  Additionally, she endorses subjective fever and chills earlier yesterday  SIRS criteria: Tachycardia, leukocytosis  Suspected source: UTI  · UA: Large leukocytes, 100(2+) protein, large occult blood  · Urine microscopic: RBC 10-20, innumerable WBCs  · Urine culture pending; no prior urine cultures for comparison  Lactic acid: 1 2  End organ damage: AIMEE > 2 0  IV Fluids: Received 1 L of LR in ED  IV antibiotics: IV Rocephin  · Urine cultures growing E  Coli  · Follow final sensitivities    Compression fracture of T11 vertebra (HCC)  Assessment & Plan  · CT A/P: \"Moderate T11 compression fracture new since 2020  Indeterminate but may be subacute  \"  · Patient denies any pain  · Or spinal tenderness  · She is not interested in brace  · PT/OT evaluations-outpatient PT recommended    Atrial fibrillation [facility])  Assessment & Plan  Patient is not on any rate or rhythm controlling agents  Antiplatelet/Anticoagulation: Eliquis    Chronic diastolic heart failure (HCC)  Assessment & Plan  Wt Readings from Last 3 Encounters:   04/28/23 72 kg (158 lb 11 7 oz)    Appears compensated at this time   Last echocardiogram from May 2022:  \"Left Ventricle: Left ventricle is normal in size  There is mild   concentric hypertrophy  Systolic function is normal with an ejection   fraction of 60-65%  Abnormal septal motion  \"   Monitor intake and output, daily weights   Low sodium diet and fluid restriction   Patient is not currently on a diuretic      Continue to monitor volume status with IV hydration    Polyarticular psoriatic arthritis " "([facility])  Assessment & Plan  · Patient is known to Dr Anil Rushing of [facility] as an outpatient  · Patient maintained on weekly Methotrexate and Simponi Aria infusions  Type 2 diabetes mellitus, without long-term current use of insulin [facility])  Assessment & Plan  No results found for: HGBA1C    Recent Labs     04/27/23  1608 04/27/23  2114 04/28/23  0738 04/28/23  1131   POCGLU 175* 206* 194* 126       Blood Sugar Average: Last 72 hrs:  · (P) 170 [id number] A1C   · PTA regimen of Metformin, will hold while inpatient  · Accu-checks and SSI  · Hypoglycemia protocol  Acute kidney injury superimposed on CKD [facility])  Assessment & Plan  Lab Results   Component Value Date    EGFR 17 04/28/2023    EGFR 19 04/27/2023    EGFR 21 04/26/2023    CREATININE 2 52 (H) 04/28/2023    CREATININE 2 22 (H) 04/27/2023    CREATININE 2 10 (H) 04/26/2023    Creatinine upon admission: 2 10  o Baseline: 1 2-1 3   Secondary to poor oral intake   Treatment: IV hydration, hold Losartan  · Creatinine remains elevated at 2 5  · Check renal ultrasound  · Check bladder scan  · Check postvoid residual    Generalized weakness  Assessment & Plan  · Patient presented as a trauma alert due to falling x2 at home while on Eliquis  Patient reports chronic, generalized weakness; however, she endorses increased weakness compared to baseline  Additionally, she endorses subjective fever and chills earlier yesterday  · Suspect 2/2 UTI  · Obtain COVID/Influenza/RSV  · CT head: \"No acute intracranial abnormality  \"  · CT cervical spine: \"No cervical spine fracture or traumatic malalignment  \"  · Fall precautions  · Outpatient PT recommended      VTE Pharmacologic Prophylaxis: VTE Score: 6 High Risk (Score >/= 5) - Pharmacological DVT Prophylaxis Ordered: apixaban (Eliquis)  Sequential Compression Devices Ordered  Patient Centered Rounds: I performed bedside rounds with nursing staff today    Discussions with Specialists or Other Care " Team Provider:     Education and Discussions with Family / Patient: Attempted to update  (daughter) via phone  Left voicemail  Total Time Spent on Date of Encounter in care of patient: 25 minutes This time was spent on one or more of the following: performing physical exam; counseling and coordination of care; obtaining or reviewing history; documenting in the medical record; reviewing/ordering tests, medications or procedures; communicating with other healthcare professionals and discussing with patient's family/caregivers  Current Length of Stay: 1 day(s)  Current Patient Status: Inpatient   Certification Statement: The patient will continue to require additional inpatient hospital stay due to IV antibiotics  Discharge Plan: Anticipate discharge in 24-48 hrs to home  Code Status: Level 1 - Full Code    Subjective:     Patient feels okay today  She wants to go home soon  Denies any abdominal pain nausea or vomiting  Tolerating diet  Objective:     Vitals:   Temp (24hrs), Av 3 °F (36 8 °C), Min:97 6 °F (36 4 °C), Max:99 5 °F (37 5 °C)    Temp:  [97 6 °F (36 4 °C)-99 5 °F (37 5 °C)] 97 6 °F (36 4 °C)  HR:  [68-82] 71  Resp:  [16-18] 18  BP: (137-142)/(64-65) 140/65  SpO2:  [93 %-95 %] 93 %  Body mass index is 26 41 kg/m²  Input and Output Summary (last 24 hours): Intake/Output Summary (Last 24 hours) at 2023 1220  Last data filed at 2023 1029  Gross per 24 hour   Intake --   Output 900 ml   Net -900 ml       Physical Exam:     Gen -Patient comfortable   Neck- Supple  No thyromegaly or lymphadenopathy  Lungs-Clear bilaterally without any wheeze or rales   Heart S1-S2, regular rate and rhythm, no murmurs  Abdomen-soft nontender, no organomegaly   Bowel sounds present  Extremities-no cyanosi,  clubbing no significant edema    Skin- no rash  Neuro-awake alert and oriented       Additional Data:     Labs:  Results from last 7 days   Lab Units 23  0441   WBC Thousand/uL 9 47   HEMOGLOBIN g/dL 10 4*   HEMATOCRIT % 32 2*   PLATELETS Thousands/uL 102*   NEUTROS PCT % 54   LYMPHS PCT % 33   MONOS PCT % 11   EOS PCT % 2     Results from last 7 days   Lab Units 04/28/23  0441 04/27/23  0543 04/26/23  2119   SODIUM mmol/L 139   < > 132*   POTASSIUM mmol/L 3 3*   < > 3 4*   CHLORIDE mmol/L 109*   < > 102   CO2 mmol/L 23   < > 20*   BUN mg/dL 32*   < > 27*   CREATININE mg/dL 2 52*   < > 2 10*   ANION GAP mmol/L 7   < > 10   CALCIUM mg/dL 8 9   < > 8 7   ALBUMIN g/dL  --   --  3 8   TOTAL BILIRUBIN mg/dL  --   --  0 57   ALK PHOS U/L  --   --  94   ALT U/L  --   --  23   AST U/L  --   --  34   GLUCOSE RANDOM mg/dL 144*   < > 245*    < > = values in this interval not displayed  Results from last 7 days   Lab Units 04/28/23  1131 04/28/23  0738 04/27/23  2114 04/27/23  1608 04/27/23  1049 04/27/23  0707 04/27/23  0245   POC GLUCOSE mg/dl 126 194* 206* 175* 144* 152* 198*         Results from last 7 days   Lab Units 04/27/23  0543 04/27/23  0054 04/26/23  2119   LACTIC ACID mmol/L  --  1 2  --    PROCALCITONIN ng/ml 9 26*  --  2 63*       Lines/Drains:  Invasive Devices     Peripheral Intravenous Line  Duration           Peripheral IV 04/27/23 Dorsal (posterior); Left Forearm <1 day                      Imaging: Reviewed radiology reports from this admission including: chest xray    Recent Cultures (last 7 days):   Results from last 7 days   Lab Units 04/27/23  0054 04/27/23  0050 04/26/23  2251   BLOOD CULTURE  No Growth at 24 hrs   No Growth at 24 hrs   --    URINE CULTURE   --   --  >100,000 cfu/ml Escherichia coli*       Last 24 Hours Medication List:   Current Facility-Administered Medications   Medication Dose Route Frequency Provider Last Rate    acetaminophen  650 mg Oral Q6H PRN Radha Setting, CRNP      amLODIPine  10 mg Oral Daily Radha Setting, CRNP      apixaban  2 5 mg Oral BID Radha Setting, CRNP      atorvastatin  40 mg Oral Daily Radha Setting, CRNP      bimatoprost  1 drop Both Eyes HS GRACE Butler      cefTRIAXone  1,000 mg Intravenous Q24H Philip Gutting, CRNP 1,000 mg (04/27/23 0807)    cholecalciferol  2,000 Units Oral Daily Philip Gutting, CRNP      folic acid  1 mg Oral Daily Philip Gutting, CRNP      insulin lispro  1-5 Units Subcutaneous HS Philip Gutting, CRNP      insulin lispro  1-6 Units Subcutaneous TID AC GRACE Butler      lactated ringers  75 mL/hr Intravenous Continuous Maxwell Mojica MD      SHANNA ANTIFUNGAL   Topical BID Maxwell Mojica MD      timolol  1 drop Both Eyes BID Philip Gutting, CRNP          Today, Patient Was Seen By: Forest Nageotte, MD    **Please Note: This note may have been constructed using a voice recognition system  **

## 2023-04-28 NOTE — ASSESSMENT & PLAN NOTE
No results found for: HGBA1C    Recent Labs     04/27/23  1608 04/27/23  2114 04/28/23  0738 04/28/23  1131   POCGLU 175* 206* 194* 126       Blood Sugar Average: Last 72 hrs:  · (P) 170 [id number] A1C   · PTA regimen of Metformin, will hold while inpatient  · Accu-checks and SSI  · Hypoglycemia protocol

## 2023-04-28 NOTE — PROGRESS NOTES
Pt is A/O x4  Denies pain/discomfort at the moment  Pt ambulated to [facility] and now back in bed  Safety measures are in place, will continue to monitor

## 2023-04-28 NOTE — ASSESSMENT & PLAN NOTE
"· CT A/P: \"Moderate T11 compression fracture new since 2020  Indeterminate but may be subacute  \"  · Patient denies any pain  · Or spinal tenderness  · She is not interested in brace  · PT/OT evaluations-outpatient PT recommended  "

## 2023-04-29 VITALS
HEIGHT: 65 IN | OXYGEN SATURATION: 95 % | SYSTOLIC BLOOD PRESSURE: 118 MMHG | HEART RATE: 71 BPM | DIASTOLIC BLOOD PRESSURE: 76 MMHG | RESPIRATION RATE: 18 BRPM | WEIGHT: 155.2 LBS | TEMPERATURE: 97.7 F | BODY MASS INDEX: 25.86 KG/M2

## 2023-04-29 LAB
ANION GAP SERPL CALCULATED.3IONS-SCNC: 9 MMOL/L (ref 4–13)
BACTERIA UR CULT: ABNORMAL
BASOPHILS # BLD AUTO: 0.03 THOUSANDS/ÂΜL (ref 0–0.1)
BASOPHILS NFR BLD AUTO: 0 % (ref 0–1)
BUN SERPL-MCNC: 28 MG/DL (ref 5–25)
CALCIUM SERPL-MCNC: 8.5 MG/DL (ref 8.4–10.2)
CHLORIDE SERPL-SCNC: 108 MMOL/L (ref 96–108)
CO2 SERPL-SCNC: 22 MMOL/L (ref 21–32)
CREAT SERPL-MCNC: 2.38 MG/DL (ref 0.6–1.3)
EOSINOPHIL # BLD AUTO: 0.29 THOUSAND/ÂΜL (ref 0–0.61)
EOSINOPHIL NFR BLD AUTO: 3 % (ref 0–6)
ERYTHROCYTE [DISTWIDTH] IN BLOOD BY AUTOMATED COUNT: 13.4 % (ref 11.6–15.1)
GFR SERPL CREATININE-BSD FRML MDRD: 18 ML/MIN/1.73SQ M
GLUCOSE SERPL-MCNC: 153 MG/DL (ref 65–140)
GLUCOSE SERPL-MCNC: 162 MG/DL (ref 65–140)
GLUCOSE SERPL-MCNC: 200 MG/DL (ref 65–140)
HCT VFR BLD AUTO: 33.3 % (ref 34.8–46.1)
HGB BLD-MCNC: 11.1 G/DL (ref 11.5–15.4)
IMM GRANULOCYTES # BLD AUTO: 0.02 THOUSAND/UL (ref 0–0.2)
IMM GRANULOCYTES NFR BLD AUTO: 0 % (ref 0–2)
LYMPHOCYTES # BLD AUTO: 2.97 THOUSANDS/ÂΜL (ref 0.6–4.47)
LYMPHOCYTES NFR BLD AUTO: 30 % (ref 14–44)
MCH RBC QN AUTO: 32.5 PG (ref 26.8–34.3)
MCHC RBC AUTO-ENTMCNC: 33.3 G/DL (ref 31.4–37.4)
MCV RBC AUTO: 97 FL (ref 82–98)
MONOCYTES # BLD AUTO: 1.04 THOUSAND/ÂΜL (ref 0.17–1.22)
MONOCYTES NFR BLD AUTO: 11 % (ref 4–12)
NEUTROPHILS # BLD AUTO: 5.45 THOUSANDS/ÂΜL (ref 1.85–7.62)
NEUTS SEG NFR BLD AUTO: 56 % (ref 43–75)
NRBC BLD AUTO-RTO: 0 /100 WBCS
PLATELET # BLD AUTO: 111 THOUSANDS/UL (ref 149–390)
PMV BLD AUTO: 10.4 FL (ref 8.9–12.7)
POTASSIUM SERPL-SCNC: 2.9 MMOL/L (ref 3.5–5.3)
RBC # BLD AUTO: 3.42 MILLION/UL (ref 3.81–5.12)
SODIUM SERPL-SCNC: 139 MMOL/L (ref 135–147)
WBC # BLD AUTO: 9.8 THOUSAND/UL (ref 4.31–10.16)

## 2023-04-29 RX ORDER — POTASSIUM CHLORIDE 20 MEQ/1
40 TABLET, EXTENDED RELEASE ORAL
Status: COMPLETED | OUTPATIENT
Start: 2023-04-29 | End: 2023-04-29

## 2023-04-29 RX ORDER — CEPHALEXIN 500 MG/1
500 CAPSULE ORAL EVERY 8 HOURS SCHEDULED
Qty: 12 CAPSULE | Refills: 0 | Status: SHIPPED | OUTPATIENT
Start: 2023-04-29 | End: 2023-05-03

## 2023-04-29 RX ADMIN — FOLIC ACID 1 MG: 1 TABLET ORAL at 08:20

## 2023-04-29 RX ADMIN — POTASSIUM CHLORIDE 40 MEQ: 1500 TABLET, EXTENDED RELEASE ORAL at 11:59

## 2023-04-29 RX ADMIN — INSULIN LISPRO 1 UNITS: 100 INJECTION, SOLUTION INTRAVENOUS; SUBCUTANEOUS at 08:22

## 2023-04-29 RX ADMIN — INSULIN LISPRO 2 UNITS: 100 INJECTION, SOLUTION INTRAVENOUS; SUBCUTANEOUS at 11:59

## 2023-04-29 RX ADMIN — ATORVASTATIN CALCIUM 40 MG: 40 TABLET, FILM COATED ORAL at 08:20

## 2023-04-29 RX ADMIN — MICONAZOLE NITRATE: 20 CREAM TOPICAL at 08:21

## 2023-04-29 RX ADMIN — POTASSIUM CHLORIDE 40 MEQ: 1500 TABLET, EXTENDED RELEASE ORAL at 13:25

## 2023-04-29 RX ADMIN — Medication 2000 UNITS: at 08:20

## 2023-04-29 RX ADMIN — TIMOLOL MALEATE 1 DROP: 5 SOLUTION/ DROPS OPHTHALMIC at 08:22

## 2023-04-29 RX ADMIN — APIXABAN 2.5 MG: 2.5 TABLET, FILM COATED ORAL at 08:20

## 2023-04-29 NOTE — CASE MANAGEMENT
Case Management Discharge Planning Note    Patient name Jacqueline Adorno  Location S /S -01 MRN [id number]  : 1940 Date 2023       Current Admission Date: 2023  Current Admission Diagnosis:Severe sepsis due to UTI [facility])   Patient Active Problem List    Diagnosis Date Noted    Severe sepsis due to UTI ([facility] ) 2023    Acute kidney injury superimposed on CKD ([place] [address] ) 2023    Type 2 diabetes mellitus, without long-term current use of insulin ([place] [address] ) 2023    Polyarticular psoriatic arthritis ([place] [address] ) 2023    Chronic diastolic heart failure ([place] [address] ) 2023    Atrial fibrillation ([place] [address] ) 2023    Compression fracture of T11 vertebra (HCC) 2023    Generalized weakness 2023      LOS (days): 2  Geometric Mean LOS (GMLOS) (days): 3 50  Days to GMLOS:0 9     OBJECTIVE:  Risk of Unplanned Readmission Score: 14 32         Current admission status: Inpatient   [facility]acy:   [facility]  No address on file      Primary Care Provider: Minh Arana MD    Primary Insurance: MEDICARE  Secondary Insurance: [facility]    DISCHARGE DETAILS:  CM attempted to reach spouse x 2 via phone  Phone busy  CM reserved [facility] for Home PT/OT and sent AVS/order to agency

## 2023-04-29 NOTE — ASSESSMENT & PLAN NOTE
"Wt Readings from Last 3 Encounters:   04/29/23 70 4 kg (155 lb 3 3 oz)    Appears compensated at this time   Last echocardiogram from May 2022:  \"Left Ventricle: Left ventricle is normal in size  There is mild   concentric hypertrophy  Systolic function is normal with an ejection   fraction of 60-65%  Abnormal septal motion  \"   Monitor intake and output, daily weights   Low sodium diet and fluid restriction     Remains euvolemic  "

## 2023-04-29 NOTE — ASSESSMENT & PLAN NOTE
Lab Results   Component Value Date    EGFR 18 04/29/2023    EGFR 17 04/28/2023    EGFR 19 04/27/2023    CREATININE 2 38 (H) 04/29/2023    CREATININE 2 52 (H) 04/28/2023    CREATININE 2 22 (H) 04/27/2023    Creatinine upon admission: 2 10  o Baseline: 1 2-1 3   Secondary to poor oral intake   Treatment: IV hydration, hold Losartan  · Creatinine plateaued around 3 2-0 4    Likely new baseline  · Renal ultrasound without any evidence of hydronephrosis  · Check bladder scan  · No evidence of any urinary retention

## 2023-04-29 NOTE — DISCHARGE SUMMARY
"[facility]  Discharge- Anish Islasn 1940, 79 y o  female MRN: [id number]  Unit/Bed#: S -01 Encounter: [id number]  Primary Care Provider: Kannan Venegas MD   Date and time admitted to hospital: 4/26/2023  8:44 PM    * Severe sepsis due to UTI [facility])  Assessment & Plan  · Patient reports chronic, generalized weakness; however, she endorses increased weakness compared to baseline  Additionally, she endorses subjective fever and chills earlier yesterday  SIRS criteria: Tachycardia, leukocytosis  Suspected source: UTI  · UA: Large leukocytes, 100(2+) protein, large occult blood  · Urine microscopic: RBC 10-20, innumerable WBCs  · Urine culture pending; no prior urine cultures for comparison  Lactic acid: 1 2  End organ damage: AIMEE > 2 0  IV Fluids: Received 1 L of LR in ED  IV antibiotics: IV Rocephin  · Urine cultures growing E  Coli, pansensitive  · Changed to p o  Keflex    Compression fracture of T11 vertebra (HCC)  Assessment & Plan  · CT A/P: \"Moderate T11 compression fracture new since 2020  Indeterminate but may be subacute  \"  · Patient denies any pain  · Or spinal tenderness  · She is not interested in brace  · PT/OT evaluations-outpatient PT recommended    Atrial fibrillation [facility])  Assessment & Plan  Patient is not on any rate or rhythm controlling agents  Antiplatelet/Anticoagulation: Eliquis    Chronic diastolic heart failure (HCC)  Assessment & Plan  Wt Readings from Last 3 Encounters:   04/29/23 70 4 kg (155 lb 3 3 oz)    Appears compensated at this time   Last echocardiogram from May 2022:  \"Left Ventricle: Left ventricle is normal in size  There is mild   concentric hypertrophy  Systolic function is normal with an ejection   fraction of 60-65%  Abnormal septal motion  \"   Monitor intake and output, daily weights   Low sodium diet and fluid restriction     Remains euvolemic    Polyarticular psoriatic arthritis ([address] )  Assessment & Plan  · Patient is " "known to [facility] as an outpatient  · Patient maintained on weekly Methotrexate and Simponi Aria infusions  Acute kidney injury superimposed on CKD [facility])  Assessment & Plan  Lab Results   Component Value Date    EGFR 18 04/29/2023    EGFR 17 04/28/2023    EGFR 19 04/27/2023    CREATININE 2 38 (H) 04/29/2023    CREATININE 2 52 (H) 04/28/2023    CREATININE 2 22 (H) 04/27/2023    Creatinine upon admission: 2 10  o Baseline: 1 2-1 3   Secondary to poor oral intake   Treatment: IV hydration, hold Losartan  · Creatinine plateaued around 3 5-1 4  Likely new baseline  · Renal ultrasound without any evidence of hydronephrosis  · Check bladder scan  · No evidence of any urinary retention    Generalized weakness  Assessment & Plan  · Patient presented as a trauma alert due to falling x2 at home while on Eliquis  Patient reports chronic, generalized weakness; however, she endorses increased weakness compared to baseline  Additionally, she endorses subjective fever and chills earlier yesterday  · Suspect 2/2 UTI  · Obtain COVID/Influenza/RSV  · CT head: \"No acute intracranial abnormality  \"  · CT cervical spine: \"No cervical spine fracture or traumatic malalignment  \"  · Fall precautions    · Outpatient PT recommended    Medical Problems     Resolved Problems  Date Reviewed: 4/28/2023   None       Discharging Physician / Practitioner: Juana Martines MD  PCP: Sharyle Novak, MD  Admission Date:   Admission Orders (From admission, onward)     Ordered        04/27/23 0009  INPATIENT ADMISSION  Once                      Discharge Date: 04/29/23    Consultations During Hospital Stay:  · Trauma service    Procedures Performed:   · Ultrasound kidneys without any evidence of hydronephrosis  · CT abdomen and pelvis without contrast: Moderate T11 compression fracture new since 2020  · CT cervical spine without any acute findings  · CT head negative for acute intracranial abnormality  · Chest x-ray " "normal    Reason for Admission: [facility] Course:   Martine Mack is a 79 y o  female patient who originally presented to the hospital on 4/26/2023 due to generalized weakness fever chills and fall  Patient upon evaluation noted to have findings of UTI  Due to fall patient was seen by trauma service, there was no acute traumatic injuries  She did have T11 fracture which is likely chronic, did not complain of any back pain  Refused any further testing or intervention  For UTI she was treated with IV ceftriaxone  Urine cultures grew E  coli which is pansensitive  She was noted to have acute kidney injury on admission, she was treated with IV fluids  Renal ultrasound without any acute hydronephrosis  Patient creatinine plateaued around 7 8-3 0  She is being discharged home with outpatient follow-up  Repeat BMP in 1 week  Follow-up with PCP  Please see above list of diagnoses and related plan for additional information  Condition at Discharge: good    Discharge Day Visit / Exam:   Subjective: Patient wants to go home  No acute events reported overnight  Vitals: Blood Pressure: 118/76 (04/29/23 0649)  Pulse: 71 (04/29/23 0649)  Temperature: 97 7 °F (36 5 °C) (04/29/23 0649)  Temp Source: Oral (04/27/23 3156)  Respirations: 18 (04/29/23 0649)  Height: 5' 5\" (165 1 cm) (per previous encounter) (04/27/23 1130)  Weight - Scale: 70 4 kg (155 lb 3 3 oz) (04/29/23 0600)  SpO2: 95 % (04/29/23 0649)  Exam:   Physical Exam     Gen -Patient comfortable at rest  Neck- Supple  No thyromegaly or lymphadenopathy  Lungs-Clear bilaterally without any wheeze or rales   Heart S1-S2, regular rate and rhythm, no murmurs  Abdomen-soft nontender, no organomegaly  Bowel sounds present  Extremities-no cyanosi,  clubbing or edema  Skin- no rash  Neuro-awake alert and oriented       Discussion with Family: Updated  (daughter) via phone      Discharge instructions/Information to patient and family: " See after visit summary for information provided to patient and family  Provisions for Follow-Up Care:  See after visit summary for information related to follow-up care and any pertinent home health orders  Disposition:   Home    Planned Readmission:      Discharge Statement:  I spent 35 minutes discharging the patient  This time was spent on the day of discharge  I had direct contact with the patient on the day of discharge  Greater than 50% of the total time was spent examining patient, answering all patient questions, arranging and discussing plan of care with patient as well as directly providing post-discharge instructions  Additional time then spent on discharge activities  Discharge Medications:  See after visit summary for reconciled discharge medications provided to patient and/or family        **Please Note: This note may have been constructed using a voice recognition system**

## 2023-04-29 NOTE — ASSESSMENT & PLAN NOTE
· Patient is known to Dr Joey Estrada of [facility] as an outpatient  · Patient maintained on weekly Methotrexate and Simponi Aria infusions

## 2023-04-29 NOTE — CASE MANAGEMENT
Case Management Discharge Planning Note    Patient name Micky Wiggins  Location S /S -01 MRN [id number]  : 1940 Date 2023       Current Admission Date: 2023  Current Admission Diagnosis:Severe sepsis due to UTI [facility])   Patient Active Problem List    Diagnosis Date Noted    Severe sepsis due to UTI ([facility] ) 2023    Acute kidney injury superimposed on CKD ([place] [address] ) 2023    Type 2 diabetes mellitus, without long-term current use of insulin ([place] [address] ) 2023    Polyarticular psoriatic arthritis ([place] [address] ) 2023    Chronic diastolic heart failure ([place] [address] ) 2023    Atrial fibrillation ([place] [address] ) 2023    Compression fracture of T11 vertebra (HCC) 2023    Generalized weakness 2023      LOS (days): 2  Geometric Mean LOS (GMLOS) (days): 3 50  Days to GMLOS:0 9     OBJECTIVE:  Risk of Unplanned Readmission Score: 14 32         Current admission status: Inpatient   Preferred Pharmacy:   [facility]  No address on file      Primary Care Provider: Jc Gastelum MD    Primary Insurance: MEDICARE  Secondary Insurance: [facility]    DISCHARGE DETAILS:    [address]         Is the patient interested in [address] at discharge?: Yes  [address] requested[de-identified] Occupational Therapy, Physical [address] Name[de-identified] Other  [address] [place] [address] Provider[de-identified] PCP  Home Health Services Needed[de-identified] Evaluate Functional Status and Safety, Gait/ADL Training, Strengthening/Theraputic Exercises to Improve Function  Homebound Criteria Met[de-identified] Requires the Assistance of Another Person for Safe Ambulation or to Leave the Home  Supporting Clincal Findings[de-identified] Limited Endurance    CM spoke with patient, spouse and daughter at bedside  CM introduced self and role  CM reviewed with patient and family at the bedside per PT, the recommendation at discharge is OP PT services   Patient and family requesting Home therapy services at discharge  Daughter requesting outpatient information on resources for her mother  CM provided [facility] booklet, waiver contact number for [facility], [place], Non skilled HHA list at bedside  All questions/concerns answered at this time  CM sent blanket referral for [facility] Home PT/OT  Waiting for options

## 2023-04-29 NOTE — ASSESSMENT & PLAN NOTE
· Patient reports chronic, generalized weakness; however, she endorses increased weakness compared to baseline  Additionally, she endorses subjective fever and chills earlier yesterday  SIRS criteria: Tachycardia, leukocytosis  Suspected source: UTI  · UA: Large leukocytes, 100(2+) protein, large occult blood  · Urine microscopic: RBC 10-20, innumerable WBCs  · Urine culture pending; no prior urine cultures for comparison  Lactic acid: 1 2  End organ damage: AIMEE > 2 0  IV Fluids: Received 1 L of LR in ED  IV antibiotics: IV Rocephin  · Urine cultures growing E  Coli, pansensitive  · Changed to p o   Keflex

## 2023-04-30 LAB
EST. AVERAGE GLUCOSE BLD GHB EST-MCNC: 174 MG/DL
HBA1C MFR BLD: 7.7 %

## 2023-05-02 LAB
BACTERIA BLD CULT: NORMAL
BACTERIA BLD CULT: NORMAL

## 2023-06-08 PROBLEM — L40.59 POLYARTICULAR PSORIATIC ARTHRITIS (HCC): Status: RESOLVED | Noted: 2023-04-27 | Resolved: 2023-06-08

## 2023-06-26 PROBLEM — A41.9 SEVERE SEPSIS (HCC): Status: RESOLVED | Noted: 2023-04-27 | Resolved: 2023-06-26

## 2023-06-26 PROBLEM — R65.20 SEVERE SEPSIS (HCC): Status: RESOLVED | Noted: 2023-04-27 | Resolved: 2023-06-26

## 2023-10-30 PROBLEM — D72.829 LEUKOCYTOSIS: Status: ACTIVE | Noted: 2023-10-30

## 2023-10-30 PROBLEM — R63.4 WEIGHT LOSS: Status: ACTIVE | Noted: 2023-10-30

## 2023-10-30 PROBLEM — N18.4 STAGE 4 CHRONIC KIDNEY DISEASE (HCC): Status: ACTIVE | Noted: 2023-10-30

## 2023-10-30 PROBLEM — M81.0 SENILE OSTEOPOROSIS: Status: ACTIVE | Noted: 2023-10-30

## 2024-01-12 ENCOUNTER — TELEPHONE (OUTPATIENT)
Dept: INFUSION CENTER | Facility: HOSPITAL | Age: 84
End: 2024-01-12

## 2024-01-12 NOTE — TELEPHONE ENCOUNTER
Called and spoke directly with patient to schedule Simponi Aria infusion on 2/15/2024 at 1230 pm at [facility]. Pt and  provided with unit address and phone number.

## 2024-02-08 DIAGNOSIS — L40.59 POLYARTICULAR PSORIATIC ARTHRITIS (HCC): Primary | ICD-10-CM

## 2024-02-08 RX ORDER — SODIUM CHLORIDE 9 MG/ML
20 INJECTION, SOLUTION INTRAVENOUS ONCE
OUTPATIENT
Start: 2024-02-15

## 2024-02-15 ENCOUNTER — OFFICE VISIT (OUTPATIENT)
Age: 84
End: 2024-02-15

## 2024-02-15 ENCOUNTER — TELEPHONE (OUTPATIENT)
Age: 84
End: 2024-02-15

## 2024-02-15 VITALS
SYSTOLIC BLOOD PRESSURE: 114 MMHG | DIASTOLIC BLOOD PRESSURE: 62 MMHG | OXYGEN SATURATION: 98 % | WEIGHT: 143.2 LBS | TEMPERATURE: 98.1 F | HEART RATE: 71 BPM | BODY MASS INDEX: 24.45 KG/M2 | HEIGHT: 64 IN

## 2024-02-15 DIAGNOSIS — M81.0 SENILE OSTEOPOROSIS: ICD-10-CM

## 2024-02-15 DIAGNOSIS — M47.816 SPONDYLOSIS OF LUMBAR REGION WITHOUT MYELOPATHY OR RADICULOPATHY: ICD-10-CM

## 2024-02-15 DIAGNOSIS — Z79.899 ENCOUNTER FOR LONG-TERM (CURRENT) USE OF OTHER MEDICATIONS: ICD-10-CM

## 2024-02-15 DIAGNOSIS — L40.59 POLYARTICULAR PSORIATIC ARTHRITIS (HCC): Primary | ICD-10-CM

## 2024-02-15 NOTE — ASSESSMENT & PLAN NOTE
Continue following with cardiology.  She did have an echocardiogram in June 2023 which revealed an ejection fraction of 60 to 65%.

## 2024-02-15 NOTE — ASSESSMENT & PLAN NOTE
Likely clinical osteoporosis with history of asymptomatic T11 compression fracture.  Her most recent DEXA scan done several years ago did reveal osteopenia however she has not had an updated DEXA.  She does have a prescription for this however she has not scheduled an updated test yet.  I did encourage her to have this done so that we can completely evaluate her bone density.  She would likely be a candidate for Prolia however she has not interested in pursuing additional treatment at this time.  We will discuss again at her next office visit.

## 2024-02-15 NOTE — TELEPHONE ENCOUNTER
Lorin called in to r/s her infusion . Patient stated her and  got lost tryng to get to her infusion appointment and decided to go back home . Transfer over to central scheduling

## 2024-02-15 NOTE — ASSESSMENT & PLAN NOTE
Lower back pain is generally stable however she does have chronic neurogenic symptoms.  Encouraged home exercise program.

## 2024-02-15 NOTE — ASSESSMENT & PLAN NOTE
Her psoriatic arthritis is stable with Simponi Aria infusions and oral methotrexate.  No signs of active inflammation or synovitis on exam today.  We will continue to monitor her response to treatment.  Labs as ordered every 3 months to monitor for medication side effects and toxicity.  She is up-to-date with her QuantiFERON gold testing and will be due for an updated test in July of this year.  Follow-up in 3 to 4 months or sooner if needed.

## 2024-02-15 NOTE — TELEPHONE ENCOUNTER
Lorin called in to r/s her infusion . Patient stated her and  got lost tryng to get to her infusion appointment and decided to go back home . Transfer over to central scheduling    ,

## 2024-02-15 NOTE — PROGRESS NOTES
Assessment/Plan:    Polyarticular psoriatic arthritis (HCC)  Her psoriatic arthritis is stable with Simponi Aria infusions and oral methotrexate.  No signs of active inflammation or synovitis on exam today.  We will continue to monitor her response to treatment.  Labs as ordered every 3 months to monitor for medication side effects and toxicity.  She is up-to-date with her QuantiFERON gold testing and will be due for an updated test in July of this year.  Follow-up in 3 to 4 months or sooner if needed.    Senile osteoporosis  Likely clinical osteoporosis with history of asymptomatic T11 compression fracture.  Her most recent DEXA scan done several years ago did reveal osteopenia however she has not had an updated DEXA.  She does have a prescription for this however she has not scheduled an updated test yet.  I did encourage her to have this done so that we can completely evaluate her bone density.  She would likely be a candidate for Prolia however she has not interested in pursuing additional treatment at this time.  We will discuss again at her next office visit.    Spondylosis of lumbar region without myelopathy or radiculopathy  Lower back pain is generally stable however she does have chronic neurogenic symptoms.  Encouraged home exercise program.    Chronic diastolic heart failure (HCC)  Continue following with cardiology.  She did have an echocardiogram in June 2023 which revealed an ejection fraction of 60 to 65%.           Diagnoses and all orders for this visit:    Polyarticular psoriatic arthritis (HCC)  -     CBC and differential; Standing  -     Comprehensive metabolic panel; Standing  -     Sedimentation rate, automated; Standing  -     C-reactive protein; Standing  -     Urinalysis with microscopic; Standing    Encounter for long-term (current) use of other medications  -     CBC and differential; Standing  -     Comprehensive metabolic panel; Standing  -     Sedimentation rate, automated; Standing  -      C-reactive protein; Standing  -     Urinalysis with microscopic; Standing    Senile osteoporosis  -     DXA bone density spine hip and pelvis; Future    Spondylosis of lumbar region without myelopathy or radiculopathy        Spent 30 minutes total reviewing labs, chart notes, imaging studies, performing history and physical exam, discussing medication and treatment, counseling patient, and completing chart note.        Subjective:      Patient ID: Lorin Lomas is a 83 y.o. female.    She is here for follow-up of her psoriatic arthritis.  She was initially started on methotrexate in 2009.  She also has been treated with Remicade infusions and is now on Simponi Aria infusions which she started in December 2020.  Her psoriatic arthritis is stable Simponi Aria infusions and methotrexate.  She has minimal stiffness in the morning.  She has no swelling in her joints.  She has no signs of dactylitis or enthesitis.  Her psoriasis is generally quiescent as well.    She has significant lumbar degenerative disc disease with neurogenic symptoms causing tired, heavy legs.  She has difficulty standing or walking for any distance.  She had an MRI of her lumbar spine in June 2023 which revealed multilevel degenerative changes, mild to moderate spinal canal narrowing, as well as a new compression fracture at T11.  She has a history of osteopenia.  She does have a prescription for an updated DEXA scan however she has not had this done yet.    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  She follows with cardiology for persistent atrial fibrillation status post cardioversion.  She is currently taking Eliquis.  She does have a history of chronic diastolic heart failure.  Her most recent echocardiogram from June 2023 reveals an ejection fraction of 60 to 65%.    She had labs done on 11/10/2023.  H/H 11.2/34.1.  Glucose 142.   "Creatinine 1.60, GFR 32.  ESR 39, CRP within normal limits.  She had a metabolic workup done on 10/30/2023.  PTH, TSH, vitamin D within normal limits.  SPEP pattern suggests polyclonal hypergammaglobulinemia.  She had an elevated IgA as well.  She is up-to-date with her QuantiFERON gold testing and had a negative test on 7/3/2023.      The following portions of the patient's history were reviewed and updated as appropriate: allergies, current medications, past family history, past medical history, past social history, past surgical history, and problem list.    Review of Systems   Constitutional:  Negative for chills, fatigue and fever.   HENT:  Positive for hearing loss. Negative for sore throat and tinnitus.         Dry mouth   Eyes:  Negative for pain and visual disturbance.   Respiratory:  Negative for cough and shortness of breath.    Gastrointestinal:  Negative for abdominal pain, nausea and vomiting.   Genitourinary:  Negative for difficulty urinating.   Musculoskeletal:  Positive for arthralgias and back pain. Negative for gait problem, joint swelling, myalgias, neck pain and neck stiffness.   Skin:  Negative for rash.   Neurological:  Negative for dizziness, seizures, weakness, numbness and headaches.   Psychiatric/Behavioral:  Negative for confusion, decreased concentration and sleep disturbance.          Objective:      /62 (BP Location: Right arm, Patient Position: Sitting, Cuff Size: Adult)   Pulse 71   Temp 98.1 °F (36.7 °C) (Tympanic)   Ht 5' 4.25\" (1.632 m)   Wt 65 kg (143 lb 3.2 oz)   SpO2 98%   BMI 24.39 kg/m²          Physical Exam  Constitutional:       Appearance: Normal appearance.   Cardiovascular:      Rate and Rhythm: Normal rate and regular rhythm.   Pulmonary:      Breath sounds: Normal breath sounds.   Musculoskeletal:      Comments: Slightly decreased lateral flexion neck.  Full range of motion bilateral shoulders, elbows, wrists.  Interphalangeal OA changes, squaring 1st CMC " joints bilateral hands.  No synovitis noted.  Full range of motion bilateral hips.  Full range of motion right knee was slightly decreased flexion left knee and patellofemoral crepitus noted.  Full range of motion left ankle with slightly decreased range of motion and soft tissue prominence right ankle.   Skin:     General: Skin is warm and dry.   Neurological:      General: No focal deficit present.      Mental Status: She is alert.         Dragon Dictation software was used to dictate this note. It may contain errors with dictating incorrect words/spelling. Please contact provider directly for any questions.

## 2024-02-23 ENCOUNTER — TELEPHONE (OUTPATIENT)
Dept: INFUSION CENTER | Facility: CLINIC | Age: 84
End: 2024-02-23

## 2024-02-23 NOTE — TELEPHONE ENCOUNTER
New Patient phone call made to home telephone number provided in chart.   No answer, LMOM w/ call back #.

## 2024-02-26 ENCOUNTER — HOSPITAL ENCOUNTER (OUTPATIENT)
Dept: INFUSION CENTER | Facility: CLINIC | Age: 84
Discharge: HOME | End: 2024-02-26
Payer: MEDICARE

## 2024-02-26 VITALS
WEIGHT: 143 LBS | HEART RATE: 71 BPM | DIASTOLIC BLOOD PRESSURE: 76 MMHG | TEMPERATURE: 97.5 F | BODY MASS INDEX: 24.36 KG/M2 | SYSTOLIC BLOOD PRESSURE: 168 MMHG | OXYGEN SATURATION: 96 % | RESPIRATION RATE: 18 BRPM

## 2024-02-26 DIAGNOSIS — L40.59 POLYARTICULAR PSORIATIC ARTHRITIS (HCC): Primary | ICD-10-CM

## 2024-02-26 PROCEDURE — 96365 THER/PROPH/DIAG IV INF INIT: CPT

## 2024-02-26 RX ORDER — SODIUM CHLORIDE 9 MG/ML
20 INJECTION, SOLUTION INTRAVENOUS ONCE
OUTPATIENT
Start: 2024-03-25

## 2024-02-26 RX ORDER — SODIUM CHLORIDE 9 MG/ML
20 INJECTION, SOLUTION INTRAVENOUS ONCE
Status: COMPLETED | OUTPATIENT
Start: 2024-02-26 | End: 2024-02-26

## 2024-02-26 RX ADMIN — GOLIMUMAB 130 MG: 50 SOLUTION INTRAVENOUS at 15:13

## 2024-02-26 RX ADMIN — SODIUM CHLORIDE 20 ML/HR: 0.9 INJECTION, SOLUTION INTRAVENOUS at 15:00

## 2024-02-26 NOTE — PROGRESS NOTES
Patient tolerated Simponi Aria today without issue. PIV removed intact, coban wrap in place. Patient confirms next appt 4/22 at 1330, appointment card provided. Patient ambulatory with walker upon DC.

## 2024-02-26 NOTE — PLAN OF CARE
Problem: Knowledge Deficit  Goal: Patient/family/caregiver demonstrates understanding of disease process, treatment plan, medications, and discharge instructions  Description: Complete learning assessment and assess knowledge base.  Interventions:  - Provide teaching at level of understanding  - Provide teaching via preferred learning methods  Outcome: Progressing     Problem: Knowledge Deficit  Goal: Patient/family/caregiver demonstrates understanding of disease process, treatment plan, medications, and discharge instructions  Description: Complete learning assessment and assess knowledge base.  Interventions:  - Provide teaching at level of understanding  - Provide teaching via preferred learning methods  Outcome: Progressing

## 2024-02-28 ENCOUNTER — TELEPHONE (OUTPATIENT)
Age: 84
End: 2024-02-28

## 2024-04-22 ENCOUNTER — HOSPITAL ENCOUNTER (OUTPATIENT)
Dept: INFUSION CENTER | Facility: CLINIC | Age: 84
Discharge: HOME | End: 2024-04-22
Payer: MEDICARE

## 2024-04-22 VITALS
BODY MASS INDEX: 23.5 KG/M2 | WEIGHT: 138 LBS | TEMPERATURE: 96.5 F | HEART RATE: 66 BPM | OXYGEN SATURATION: 99 % | DIASTOLIC BLOOD PRESSURE: 74 MMHG | SYSTOLIC BLOOD PRESSURE: 126 MMHG | RESPIRATION RATE: 18 BRPM

## 2024-04-22 DIAGNOSIS — L40.59 POLYARTICULAR PSORIATIC ARTHRITIS (HCC): Primary | ICD-10-CM

## 2024-04-22 PROCEDURE — 96365 THER/PROPH/DIAG IV INF INIT: CPT

## 2024-04-22 RX ORDER — SODIUM CHLORIDE 9 MG/ML
20 INJECTION, SOLUTION INTRAVENOUS ONCE
OUTPATIENT
Start: 2024-05-20

## 2024-04-22 RX ORDER — SODIUM CHLORIDE 9 MG/ML
20 INJECTION, SOLUTION INTRAVENOUS ONCE
Status: COMPLETED | OUTPATIENT
Start: 2024-04-22 | End: 2024-04-22

## 2024-04-22 RX ADMIN — GOLIMUMAB 125 MG: 50 SOLUTION INTRAVENOUS at 14:28

## 2024-04-22 RX ADMIN — SODIUM CHLORIDE 20 ML/HR: 0.9 INJECTION, SOLUTION INTRAVENOUS at 13:51

## 2024-04-22 NOTE — PROGRESS NOTES
Pt resting with no complaints, no signs/symptoms of infection and no recent antibiotic usage. Callbell within reach.

## 2024-05-07 DIAGNOSIS — L40.59 POLYARTICULAR PSORIATIC ARTHRITIS (HCC): Primary | ICD-10-CM

## 2024-05-07 NOTE — TELEPHONE ENCOUNTER
Requested Prescriptions     Pending Prescriptions Disp Refills    methotrexate 2.5 MG tablet 72 tablet 1     Sig: Take 6 tablets (15 mg total) by mouth once a week

## 2024-05-12 RX ORDER — METHOTREXATE 2.5 MG/1
15 TABLET ORAL WEEKLY
Qty: 72 TABLET | Refills: 1 | Status: SHIPPED | OUTPATIENT
Start: 2024-05-12 | End: 2024-11-08

## 2024-05-13 NOTE — TELEPHONE ENCOUNTER
Pt called for an update on medication.  I told her it is in the process and it appears it is in the process to be sent to [facility] for her

## 2024-05-14 ENCOUNTER — TELEPHONE (OUTPATIENT)
Dept: GASTROENTEROLOGY | Facility: CLINIC | Age: 84
End: 2024-05-14

## 2024-05-14 ENCOUNTER — TELEPHONE (OUTPATIENT)
Age: 84
End: 2024-05-14

## 2024-05-14 DIAGNOSIS — L40.59 POLYARTICULAR PSORIATIC ARTHRITIS (HCC): ICD-10-CM

## 2024-05-14 RX ORDER — METHOTREXATE 2.5 MG/1
15 TABLET ORAL WEEKLY
Qty: 72 TABLET | Refills: 1 | OUTPATIENT
Start: 2024-05-14 | End: 2024-11-10

## 2024-05-14 NOTE — TELEPHONE ENCOUNTER
PA for Methotrexate 2.5mg     Submitted via    [x]CMM-KEY [id number]  []Surescriwalkby-Case ID #   []Faxed to plan   []Other website   []Phone call Case ID #     Office notes sent, clinical questions answered. Awaiting determination    Turnaround time for your insurance to make a decision on your Prior Authorization can take 7-21 business days.

## 2024-05-14 NOTE — TELEPHONE ENCOUNTER
Pt called into refill line to check status of methotrexate. Per last encounter stating PA is needed.

## 2024-05-19 NOTE — TELEPHONE ENCOUNTER
PA for Methotrexate Approved Approved     Date(s) approved 5/14/24    Case # PA Case ID: [id number]    Patient advised by          [x] MyGoodPointst Message  [x] Phone call   []LMOM  []L/M to call office as no active Communication consent on file  []Unable to leave detailed message as VM not approved on Communication consent       Pharmacy advised by    [x]Fax  []Phone call    Approval letter scanned into Media No Approval via [facility] [id number] - PA Case ID: [id number]

## 2024-05-20 NOTE — TELEPHONE ENCOUNTER
Precious left a detailed message for the patient that her methotrexate was approved and was sent to her pharmacy.

## 2024-05-21 NOTE — TELEPHONE ENCOUNTER
PA for Methotrexate Approved     Date(s) approved 3/14/24-5/13/25    Case #[id number]    Patient advised by          [x] MyChart Message  [] Phone call   []LMOM  []L/M to call office as no active Communication consent on file  []Unable to leave detailed message as VM not approved on Communication consent       Pharmacy advised by    [x]Fax  []Phone call    Approval letter scanned into Media Yes

## 2024-05-28 ENCOUNTER — TELEPHONE (OUTPATIENT)
Age: 84
End: 2024-05-28

## 2024-06-07 ENCOUNTER — TELEPHONE (OUTPATIENT)
Age: 84
End: 2024-06-07

## 2024-06-07 NOTE — TELEPHONE ENCOUNTER
Dr. Wilfrido Calvert asking for Tammy or Dr. Freeman to return his call to discuss this patients care. Pt is currently admitted.        Call back number : [phone]

## 2024-06-17 ENCOUNTER — HOSPITAL ENCOUNTER (OUTPATIENT)
Dept: INFUSION CENTER | Facility: CLINIC | Age: 84
Discharge: HOME | End: 2024-06-17

## 2024-06-17 ENCOUNTER — TELEPHONE (OUTPATIENT)
Dept: INFUSION CENTER | Facility: CLINIC | Age: 84
End: 2024-06-17

## 2024-06-17 NOTE — TELEPHONE ENCOUNTER
Spoke with pts . Pt recently dc from hospital and is staying at [facility]. Pts  is unsure when she will be placed back at home. Please advise on whether or not infusion appts will be deferred.